# Patient Record
Sex: FEMALE | Race: WHITE | NOT HISPANIC OR LATINO | Employment: FULL TIME | URBAN - METROPOLITAN AREA
[De-identification: names, ages, dates, MRNs, and addresses within clinical notes are randomized per-mention and may not be internally consistent; named-entity substitution may affect disease eponyms.]

---

## 2019-12-30 ENCOUNTER — HOSPITAL ENCOUNTER (OUTPATIENT)
Dept: SLEEP MEDICINE | Facility: HOSPITAL | Age: 39
Discharge: HOME | End: 2019-12-30
Attending: INTERNAL MEDICINE
Payer: COMMERCIAL

## 2019-12-30 DIAGNOSIS — G47.33 OBSTRUCTIVE SLEEP APNEA (ADULT) (PEDIATRIC): ICD-10-CM

## 2019-12-30 PROCEDURE — 95806 SLEEP STUDY UNATT&RESP EFFT: CPT

## 2021-07-27 ENCOUNTER — OFFICE VISIT (OUTPATIENT)
Dept: URGENT CARE | Facility: CLINIC | Age: 41
End: 2021-07-27
Payer: COMMERCIAL

## 2021-07-27 VITALS
RESPIRATION RATE: 20 BRPM | DIASTOLIC BLOOD PRESSURE: 83 MMHG | HEIGHT: 62 IN | SYSTOLIC BLOOD PRESSURE: 133 MMHG | TEMPERATURE: 98 F | HEART RATE: 74 BPM | BODY MASS INDEX: 32.2 KG/M2 | OXYGEN SATURATION: 100 % | WEIGHT: 175 LBS

## 2021-07-27 DIAGNOSIS — Z11.59 SCREENING FOR VIRAL DISEASE: ICD-10-CM

## 2021-07-27 DIAGNOSIS — J06.9 ACUTE URI: Primary | ICD-10-CM

## 2021-07-27 LAB
CTP QC/QA: YES
SARS-COV-2 RDRP RESP QL NAA+PROBE: NEGATIVE

## 2021-07-27 PROCEDURE — U0002 COVID-19 LAB TEST NON-CDC: HCPCS | Mod: QW,S$GLB,, | Performed by: NURSE PRACTITIONER

## 2021-07-27 PROCEDURE — 1159F MED LIST DOCD IN RCRD: CPT | Mod: CPTII,S$GLB,, | Performed by: NURSE PRACTITIONER

## 2021-07-27 PROCEDURE — 99203 PR OFFICE/OUTPT VISIT, NEW, LEVL III, 30-44 MIN: ICD-10-PCS | Mod: S$GLB,,, | Performed by: NURSE PRACTITIONER

## 2021-07-27 PROCEDURE — U0002: ICD-10-PCS | Mod: QW,S$GLB,, | Performed by: NURSE PRACTITIONER

## 2021-07-27 PROCEDURE — 1160F RVW MEDS BY RX/DR IN RCRD: CPT | Mod: CPTII,S$GLB,, | Performed by: NURSE PRACTITIONER

## 2021-07-27 PROCEDURE — 3008F PR BODY MASS INDEX (BMI) DOCUMENTED: ICD-10-PCS | Mod: CPTII,S$GLB,, | Performed by: NURSE PRACTITIONER

## 2021-07-27 PROCEDURE — 99203 OFFICE O/P NEW LOW 30 MIN: CPT | Mod: S$GLB,,, | Performed by: NURSE PRACTITIONER

## 2021-07-27 PROCEDURE — 1159F PR MEDICATION LIST DOCUMENTED IN MEDICAL RECORD: ICD-10-PCS | Mod: CPTII,S$GLB,, | Performed by: NURSE PRACTITIONER

## 2021-07-27 PROCEDURE — 1160F PR REVIEW ALL MEDS BY PRESCRIBER/CLIN PHARMACIST DOCUMENTED: ICD-10-PCS | Mod: CPTII,S$GLB,, | Performed by: NURSE PRACTITIONER

## 2021-07-27 PROCEDURE — 3008F BODY MASS INDEX DOCD: CPT | Mod: CPTII,S$GLB,, | Performed by: NURSE PRACTITIONER

## 2021-07-27 RX ORDER — ACETAMINOPHEN AND PHENYLEPHRINE HCL 325; 5 MG/1; MG/1
1 TABLET ORAL DAILY
COMMUNITY
Start: 2011-12-01

## 2021-07-27 RX ORDER — LOSARTAN POTASSIUM 100 MG/1
100 TABLET ORAL DAILY
COMMUNITY
Start: 2019-10-01

## 2021-07-27 RX ORDER — PANTOPRAZOLE SODIUM 40 MG/1
40 TABLET, DELAYED RELEASE ORAL DAILY
COMMUNITY
Start: 2017-12-15

## 2021-07-27 RX ORDER — LORATADINE 10 MG/1
10 TABLET ORAL DAILY
Qty: 20 TABLET | Refills: 0 | Status: ON HOLD | OUTPATIENT
Start: 2021-07-27 | End: 2023-12-01 | Stop reason: HOSPADM

## 2021-07-27 RX ORDER — AZITHROMYCIN 250 MG/1
TABLET, FILM COATED ORAL
Qty: 6 TABLET | Refills: 0 | Status: SHIPPED | OUTPATIENT
Start: 2021-07-27 | End: 2021-08-01

## 2021-07-27 RX ORDER — LEVOTHYROXINE SODIUM 88 UG/1
88 TABLET ORAL
COMMUNITY
Start: 2010-12-01

## 2021-07-27 RX ORDER — AZELASTINE 1 MG/ML
1 SPRAY, METERED NASAL 2 TIMES DAILY
Qty: 30 ML | Refills: 0 | Status: ON HOLD | OUTPATIENT
Start: 2021-07-27 | End: 2023-12-01 | Stop reason: HOSPADM

## 2021-07-27 RX ORDER — NEBIVOLOL 10 MG/1
10 TABLET ORAL 2 TIMES DAILY
COMMUNITY
Start: 2019-10-01

## 2023-12-01 PROBLEM — D25.0 FIBROIDS, SUBMUCOSAL: Status: ACTIVE | Noted: 2023-12-01

## 2023-12-01 PROBLEM — D25.0 FIBROIDS, SUBMUCOSAL: Status: RESOLVED | Noted: 2023-12-01 | Resolved: 2023-12-01

## 2023-12-01 PROBLEM — N93.8 DYSFUNCTIONAL UTERINE BLEEDING: Status: RESOLVED | Noted: 2023-12-01 | Resolved: 2023-12-01

## 2023-12-01 PROBLEM — N93.8 DYSFUNCTIONAL UTERINE BLEEDING: Status: ACTIVE | Noted: 2023-12-01

## 2024-12-30 ENCOUNTER — OFFICE VISIT (OUTPATIENT)
Dept: OBSTETRICS AND GYNECOLOGY | Facility: CLINIC | Age: 44
End: 2024-12-30
Payer: COMMERCIAL

## 2024-12-30 ENCOUNTER — HOSPITAL ENCOUNTER (OUTPATIENT)
Dept: RADIOLOGY | Facility: OTHER | Age: 44
Discharge: HOME | End: 2024-12-30
Attending: OBSTETRICS & GYNECOLOGY
Payer: COMMERCIAL

## 2024-12-30 VITALS
SYSTOLIC BLOOD PRESSURE: 114 MMHG | BODY MASS INDEX: 31.72 KG/M2 | DIASTOLIC BLOOD PRESSURE: 78 MMHG | WEIGHT: 172.38 LBS | HEIGHT: 62 IN

## 2024-12-30 DIAGNOSIS — D25.1 INTRAMURAL, SUBMUCOUS, AND SUBSEROUS LEIOMYOMA OF UTERUS: ICD-10-CM

## 2024-12-30 DIAGNOSIS — D25.0 INTRAMURAL, SUBMUCOUS, AND SUBSEROUS LEIOMYOMA OF UTERUS: ICD-10-CM

## 2024-12-30 DIAGNOSIS — D25.1 INTRAMURAL, SUBMUCOUS, AND SUBSEROUS LEIOMYOMA OF UTERUS: Primary | ICD-10-CM

## 2024-12-30 DIAGNOSIS — D25.0 INTRAMURAL, SUBMUCOUS, AND SUBSEROUS LEIOMYOMA OF UTERUS: Primary | ICD-10-CM

## 2024-12-30 DIAGNOSIS — D25.2 INTRAMURAL, SUBMUCOUS, AND SUBSEROUS LEIOMYOMA OF UTERUS: ICD-10-CM

## 2024-12-30 DIAGNOSIS — D25.2 INTRAMURAL, SUBMUCOUS, AND SUBSEROUS LEIOMYOMA OF UTERUS: Primary | ICD-10-CM

## 2024-12-30 PROCEDURE — 99999 PR PBB SHADOW E&M-EST. PATIENT-LVL III: CPT | Mod: PBBFAC,,, | Performed by: OBSTETRICS & GYNECOLOGY

## 2024-12-30 PROCEDURE — 76830 TRANSVAGINAL US NON-OB: CPT | Mod: TC

## 2024-12-30 PROCEDURE — 76856 US EXAM PELVIC COMPLETE: CPT | Mod: 26,,, | Performed by: RADIOLOGY

## 2024-12-30 PROCEDURE — 76830 TRANSVAGINAL US NON-OB: CPT | Mod: 26,,, | Performed by: RADIOLOGY

## 2024-12-30 RX ORDER — KETOROLAC TROMETHAMINE 10 MG/1
10 TABLET, FILM COATED ORAL EVERY 6 HOURS
COMMUNITY
Start: 2024-11-01

## 2024-12-30 RX ORDER — CHOLECALCIFEROL (VITAMIN D3) 50 MCG
TABLET ORAL
COMMUNITY
Start: 2013-12-01

## 2024-12-30 RX ORDER — MELOXICAM 15 MG/1
15 TABLET ORAL DAILY
Qty: 30 TABLET | Refills: 1 | Status: SHIPPED | OUTPATIENT
Start: 2024-12-30

## 2024-12-30 NOTE — PROGRESS NOTES
Subjective     Patient ID: Shara Cordoba is a 44 y.o. female.    Chief Complaint:  Fibroids      History of Present Illness  Gynecologic Exam  The patient's pertinent negatives include no genital itching, genital lesions, genital odor, genital rash, missed menses, pelvic pain, vaginal bleeding or vaginal discharge. This is a chronic problem. The current episode started more than 1 month ago. The problem occurs intermittently. The problem has been gradually worsening. The pain is severe. The problem affects both sides. She is not pregnant. Associated symptoms include frequency and urgency. Pertinent negatives include no abdominal pain, anorexia, back pain, chills, constipation, diarrhea, discolored urine, dysuria, fever, flank pain, headaches, hematuria, nausea, painful intercourse, rash or vomiting. The vaginal discharge was normal. The vaginal bleeding is lighter than menses. She has not been passing clots. She has not been passing tissue. The symptoms are aggravated by activity, bowel movements, eating, tactile pressure and urinating. She has tried acetaminophen, heating pad, NSAIDs, oral narcotics and warm bath for the symptoms. The treatment provided moderate relief. She is sexually active. No, her partner does not have an STD. Her menstrual history has been regular. Her past medical history is significant for endometriosis, a gynecological surgery, menorrhagia and ovarian cysts. There is no history of an abdominal surgery, a  section, an ectopic pregnancy, herpes simplex, metrorrhagia, miscarriage, perineal abscess, PID, an STD, a terminated pregnancy or vaginosis.     Pt is 44 y.o. with Patient's last menstrual period was 2024.  Here to get a 2nd opinion about her fibroids.  Patient is currently having pelvic pressure and dyspareunia.  She recently had a hysteroscopy with polypectomy and endometrial ablation in .  Her most recent ultrasound at an outside facility found a large fibroid  measuring approximately 8 cm in size.  Patient is currently scheduled for an abdominal hysterectomy but wanted to know about different options.    GYN & OB History  Patient's last menstrual period was 2024.   Date of Last Pap: No result found    OB History    Para Term  AB Living   0 0 0 0 0 0   SAB IAB Ectopic Multiple Live Births   0 0 0 0 0       Review of Systems  Review of Systems   Constitutional:  Negative for activity change, appetite change, chills, fatigue and fever.   HENT: Negative.  Negative for tinnitus.    Eyes: Negative.    Respiratory:  Negative for cough and shortness of breath.    Cardiovascular:  Negative for chest pain and palpitations.   Gastrointestinal: Negative.  Negative for abdominal pain, anorexia, blood in stool, constipation, diarrhea, nausea and vomiting.   Endocrine: Negative.  Negative for hot flashes.   Genitourinary:  Positive for frequency, menorrhagia and urgency. Negative for dysmenorrhea, dyspareunia, dysuria, flank pain, hematuria, menstrual problem, missed menses, pelvic pain, vaginal discharge, urinary incontinence and postcoital bleeding.   Musculoskeletal:  Negative for back pain and joint swelling.   Integumentary:  Negative for rash, breast mass, nipple discharge and breast skin changes.   Neurological: Negative.  Negative for headaches.   Hematological: Negative.  Does not bruise/bleed easily.   Psychiatric/Behavioral:  The patient is not nervous/anxious.    Breast: negative.  Negative for mass, nipple discharge and skin changes         Objective   Physical Exam:   Constitutional: She is oriented to person, place, and time. She appears well-developed and well-nourished. No distress.    HENT:   Head: Normocephalic and atraumatic.    Eyes: Pupils are equal, round, and reactive to light. Conjunctivae and lids are normal.     Cardiovascular:  Normal rate and regular rhythm.      Exam reveals no edema.        Pulmonary/Chest: Effort normal.     "    Abdominal: Soft. Bowel sounds are normal. She exhibits no distension. There is no abdominal tenderness. There is no rebound and no guarding.         Genitourinary:    Vagina, right adnexa and left adnexa normal.      Pelvic exam was performed with patient supine.   The external female genitalia was normal.     Labial bartholins normal.There is no tenderness or lesion on the right labia. There is no tenderness or lesion on the left labia. Cervix is normal. Right adnexum displays no mass and no tenderness. Left adnexum displays no mass and no tenderness. No vaginal discharge, rectocele, cystocele or prolapse of vaginal walls in the vagina. Cervix exhibits no motion tenderness and no discharge. Uterus is hosting fibroids and uterine contour abnormal . Uterus is not deviated and not fixed. Normal urethral meatus.Urethra findings: no tendernessBladder findings: no bladder tenderness   Genitourinary Comments: A female chaperone was present for the entire exam    Mobile uterus, narrow base             Musculoskeletal: Normal range of motion and moves all extremeties.       Neurological: She is alert and oriented to person, place, and time. She has normal strength.    Skin: Skin is warm and dry.    Psychiatric: She has a normal mood and affect. Her speech is normal and behavior is normal. Thought content normal. Her mood appears not anxious. She does not exhibit a depressed mood. She expresses no suicidal plans and no homicidal plans.            Assessment and Plan     1. Intramural, submucous, and subserous leiomyoma of uterus             Plan:  Shara Crowder" was seen today for fibroids.    Diagnoses and all orders for this visit:    Intramural, submucous, and subserous leiomyoma of uterus  -     US Pelvis Comp with Transvag NON-OB (xpd; Future  -     meloxicam (MOBIC) 15 MG tablet; Take 1 tablet (15 mg total) by mouth once daily.      I had a long discussion with the patient and her partner about her fibroid and the " different treatment options.  We discussed the different medical therapies and surgical approaches.  Patient is currently scheduled for an abdominal hysterectomy but is nervous that the possibility of needing a vertical skin incision.  We discussed how the uterus does appear mobile but many times the surgical decision is made at the time of anesthesia and exam under anesthesia.  We discussed how it was difficult to read the ultrasound in the referral and patient was open to having a repeat ultrasound.  Patient is from the [place] area.  I discussed how a laparoscopic approach may not be unreasonable but feel that a vaginal approach would be challenging given the large anterior uterus.  We will reassess after we get the results of the new ultrasound.  We discussed the possibility of being referred to Dr. Jonse out at [facility] since I would be closer to home.  All questions were answered.

## 2024-12-31 ENCOUNTER — PATIENT MESSAGE (OUTPATIENT)
Dept: OBSTETRICS AND GYNECOLOGY | Facility: CLINIC | Age: 44
End: 2024-12-31
Payer: COMMERCIAL

## 2025-01-03 NOTE — TELEPHONE ENCOUNTER
That medication can cause hypertension, I recommend stopping it to see if the swelling gets better and then recheck her BP. If she is having a headache, blurry vision or getting BP readings > 160/90, then yes to the ER

## 2025-01-06 ENCOUNTER — TELEPHONE (OUTPATIENT)
Dept: OBSTETRICS AND GYNECOLOGY | Facility: CLINIC | Age: 45
End: 2025-01-06
Payer: COMMERCIAL

## 2025-01-06 DIAGNOSIS — D25.1 INTRAMURAL, SUBMUCOUS, AND SUBSEROUS LEIOMYOMA OF UTERUS: Primary | ICD-10-CM

## 2025-01-06 DIAGNOSIS — D25.0 INTRAMURAL, SUBMUCOUS, AND SUBSEROUS LEIOMYOMA OF UTERUS: Primary | ICD-10-CM

## 2025-01-06 DIAGNOSIS — D25.2 INTRAMURAL, SUBMUCOUS, AND SUBSEROUS LEIOMYOMA OF UTERUS: Primary | ICD-10-CM

## 2025-01-06 NOTE — TELEPHONE ENCOUNTER
----- Message from [facility] sent at 1/6/2025 10:29 AM CST -----      Name of Who is Calling: TERESITA ACEVES [[id number]]      What is the request in detail: Pt called to speak with the office regarding questions before upcoming procedure.Please contact to further discuss and advise.          Can the clinic reply by [facility]: Y      What [facility]  [phone]

## 2025-01-17 ENCOUNTER — PATIENT MESSAGE (OUTPATIENT)
Dept: OBSTETRICS AND GYNECOLOGY | Facility: CLINIC | Age: 45
End: 2025-01-17
Payer: COMMERCIAL

## 2025-01-29 ENCOUNTER — HOSPITAL ENCOUNTER (OUTPATIENT)
Dept: PREADMISSION TESTING | Facility: OTHER | Age: 45
Discharge: HOME | End: 2025-01-29
Attending: OBSTETRICS & GYNECOLOGY
Payer: COMMERCIAL

## 2025-01-29 ENCOUNTER — ANESTHESIA EVENT (OUTPATIENT)
Dept: SURGERY | Facility: OTHER | Age: 45
End: 2025-01-29
Payer: COMMERCIAL

## 2025-01-29 VITALS
TEMPERATURE: 97 F | HEART RATE: 56 BPM | HEIGHT: 62 IN | OXYGEN SATURATION: 100 % | RESPIRATION RATE: 16 BRPM | BODY MASS INDEX: 31.65 KG/M2 | DIASTOLIC BLOOD PRESSURE: 73 MMHG | WEIGHT: 172 LBS | SYSTOLIC BLOOD PRESSURE: 131 MMHG

## 2025-01-29 DIAGNOSIS — Z01.818 PREOP TESTING: Primary | ICD-10-CM

## 2025-01-29 LAB
ABO + RH BLD: NORMAL
BLD GP AB SCN CELLS X3 SERPL QL: NORMAL
SPECIMEN OUTDATE: NORMAL

## 2025-01-29 PROCEDURE — 86850 RBC ANTIBODY SCREEN: CPT | Performed by: ANESTHESIOLOGY

## 2025-01-29 PROCEDURE — 36415 COLL VENOUS BLD VENIPUNCTURE: CPT | Performed by: ANESTHESIOLOGY

## 2025-01-29 RX ORDER — LIDOCAINE HYDROCHLORIDE 10 MG/ML
0.5 INJECTION, SOLUTION EPIDURAL; INFILTRATION; INTRACAUDAL; PERINEURAL ONCE
Status: CANCELLED | OUTPATIENT
Start: 2025-01-29 | End: 2025-01-29

## 2025-01-29 RX ORDER — SODIUM CHLORIDE, SODIUM LACTATE, POTASSIUM CHLORIDE, CALCIUM CHLORIDE 600; 310; 30; 20 MG/100ML; MG/100ML; MG/100ML; MG/100ML
INJECTION, SOLUTION INTRAVENOUS CONTINUOUS
Status: CANCELLED | OUTPATIENT
Start: 2025-01-29

## 2025-01-29 RX ORDER — HYDROCHLOROTHIAZIDE 25 MG/1
25 TABLET ORAL
COMMUNITY

## 2025-01-29 RX ORDER — ACETAMINOPHEN 325 MG/1
975 TABLET ORAL
Status: CANCELLED | OUTPATIENT
Start: 2025-01-29 | End: 2025-01-29

## 2025-01-29 RX ORDER — FAMOTIDINE 20 MG/1
20 TABLET, FILM COATED ORAL
Status: CANCELLED | OUTPATIENT
Start: 2025-01-29 | End: 2025-01-29

## 2025-01-29 NOTE — DISCHARGE INSTRUCTIONS
Information to Prepare you for your Surgery    PRE-ADMIT TESTING   [address]  [place] [facility]  [address]   [phone]  - DEVEN Levy    Your surgery has been scheduled at [facility]. We are pleased to have the opportunity to serve you. For Further Information please call [phone].    On the day of surgery please report to Registration on the 1st floor of the [facility].    CONTACT YOUR PHYSICIAN'S OFFICE THE DAY PRIOR TO YOUR SURGERY TO OBTAIN YOUR ARRIVAL TIME.     The evening before surgery do not eat anything after 9 p.m. ( this includes hard candy, chewing gum and mints).  You may only have GATORADE, POWERADE AND WATER  from 9 p.m. until you leave your home.     DRINK AT LEAST 12 OUNCES THE MORNING OF SURGERY    DO NOT DRINK ANY LIQUIDS ON THE WAY TO THE HOSPITAL.      Why does your anesthesiologist allow you to drink Gatorade/Powerade before surgery?    Gatorade/Powerade helps to increase your comfort before surgery and to decrease your nausea after surgery.  The carbohydrates in the Gatorade/Powerade help reduce your body's stress response to surgery.  If you are diabetic, drink only water prior to surgery.       Outpatient Surgery- May allow 2 adults (18 and older)/ Support Persons (1 being the designated ) for all surgical/procedural patients. A breastfeeding mother will be allowed her infant and 2 adult Support Persons. No one under the age of 18 will be allowed in the building.    MEDICATION INSTRUCTIONS: TAKE medications checked off by the Anesthesiologist on your Medication List.      Angiogram Patients: Take medications as instructed by your physician, including aspirin.     Surgery Patients:  If you take ASPIRIN - Your PHYSICIAN/SURGEON will need to inform you IF/OR when you need to stop taking aspirin prior to your surgery.     Starting the week prior to surgery, do not take any medications containing IBUPROFEN or NSAIDS (Advil,  Aleve, BC, Celebrex, Goody's, Ketorolac, Meloxicam, Mobic, Motrin, Naproxen, Toradol, etc).  If you are not sure if you should take a medicine please call your surgeon's office.  You may take Tylenol.    Do Not Wear any make-up (especially eye make-up) to surgery. Please remove any false eyelashes or eyelash extensions. If you arrive the day of surgery with makeup/eyelashes on you will be required to remove prior to surgery. (There is a risk of corneal abrasions if eye makeup/eyelash extensions are not removed)    Leave all valuables at home.   Do Not wear any jewelry or watches, including any metal in body piercings. Jewelry must be removed prior to coming to the hospital.  There is a possibility that rings that are unable to be removed may be cut off if they are on the surgical extremity.    Please remove all hair extensions, wigs, clips and any other metal accessories/ ornaments from your hair.  These items may pose a flammable/fire risk in Surgery and must be removed.    Do not shave your surgical area at least 5 days prior to your surgery. The surgical prep will be performed at the hospital according to Infection Control regulations.    Contact Lens must be removed before surgery. Either do not wear the contact lens or bring a case and solution for storage.  Please bring a container for eyeglasses or dentures as required.  Bring any paperwork your physician has provided, such as consent forms,  history and physicals, doctor's orders, etc.   Bring comfortable clothes that are loose fitting to wear upon discharge. Take into consideration the type of surgery being performed.  Maintain your diet as advised per your physician the day prior to surgery.    Adequate rest the night before surgery is advised.   Park in the Parking lot behind the hospital or in the [employer] [facility] across the street from the parking lot. Parking is complimentary.  If you will be discharged the same day as your procedure, please  arrange for a responsible adult to drive you home or to accompany you if traveling by taxi.   YOU WILL NOT BE PERMITTED TO DRIVE OR TO LEAVE THE HOSPITAL ALONE AFTER SURGERY.   If you are being discharged the same day, it is strongly recommended that you arrange for someone to remain with you for the first 24 hrs following your surgery.    The Surgeon will speak to your family/visitor after your surgery regarding the outcome of your surgery and post op care.  The Surgeon may speak to you after your surgery, but there is a possibility you may not remember the details.  Please check with your family members regarding the conversation with the Surgeon.         Bathing Instructions with Hibiclens:    Shower the evening before and morning of your procedure with Chlorhexidine (Hibiclens)  do not use Chlorhexidine on your face or genitals. Do not get in your eyes.  Wash your face with water and your regular face wash/soap  Use your regular shampoo  Apply Chlorhexidine (Hibiclens) directly on your skin or on a wet washcloth and wash gently. When showering: Move away from the shower stream when applying Chlorhexidine (Hibiclens) to avoid rinsing off too soon.  Rinse thoroughly with warm water  Do not dilute Chlorhexidine (Hibiclens)   Dry off as usual, do not use any deodorant, powder, body lotions, perfume, after shave or cologne.     We strongly recommend whoever is bringing you home be present for discharge instructions.  This will ensure a thorough understanding for your post op home care.    If the patient has fever, cough, or signs/symptoms of Flu or Covid please do not come in for your surgery.  First, contact the pre op department at [phone] (unit opens at 5AM) and then contact your surgeon and your primary care physician for further instructions.      If applicable, please bring any blood pressure and/or diabetes medications with you the day of surgery.  If on home oxygen, even at night, please bring your  portable oxygen tank with you the day of surgery in case it is needed for your discharge.      Thanks,  DEVEN Levy

## 2025-01-29 NOTE — ANESTHESIA PREPROCEDURE EVALUATION
01/29/2025  Shara Cordoba is a 44 y.o., female.      Pre-op Assessment    I have reviewed the Patient Summary Reports.     I have reviewed the Nursing Notes. I have reviewed the NPO Status.   I have reviewed the Medications.     Review of Systems  Anesthesia Hx:  No problems with previous Anesthesia                Social:  Non-Smoker       EENT/Dental:  EENT/Dental Normal           Cardiovascular:     Hypertension, well controlled                                          Pulmonary:  Pulmonary Normal                       Hepatic/GI:   PUD,  GERD, well controlled                Endocrine:   Hypothyroidism        Obesity / BMI > 30  Psych:  Psychiatric Normal                    Physical Exam  General: Cooperative and Alert    Airway:  Mallampati: II   Mouth Opening: Normal  TM Distance: Normal  Tongue: Normal  Neck ROM: Normal ROM    Dental:  Intact, Veneers        Anesthesia Plan  Type of Anesthesia, risks & benefits discussed:    Anesthesia Type: Gen ETT  Intra-op Monitoring Plan: Standard ASA Monitors  Post Op Pain Control Plan: multimodal analgesia  Induction:  IV  Airway Plan: Video  Informed Consent: Informed consent signed with the Patient and all parties understand the risks and agree with anesthesia plan.  All questions answered.   ASA Score: 2  Anesthesia Plan Notes: Recent labs in media.   Saw pcp 2 weeks ago in [place], all issues addressed.    Ready For Surgery From Anesthesia Perspective.     .

## 2025-01-31 DIAGNOSIS — D25.9 FIBROID UTERUS: ICD-10-CM

## 2025-01-31 RX ORDER — MUPIROCIN 20 MG/G
OINTMENT TOPICAL
Status: CANCELLED | OUTPATIENT
Start: 2025-01-31

## 2025-01-31 RX ORDER — SODIUM CHLORIDE 9 MG/ML
INJECTION, SOLUTION INTRAVENOUS CONTINUOUS
Status: CANCELLED | OUTPATIENT
Start: 2025-01-31

## 2025-01-31 RX ORDER — FAMOTIDINE 20 MG/1
20 TABLET, FILM COATED ORAL
Status: CANCELLED | OUTPATIENT
Start: 2025-01-31

## 2025-02-03 ENCOUNTER — ANESTHESIA (OUTPATIENT)
Dept: SURGERY | Facility: OTHER | Age: 45
End: 2025-02-03
Payer: COMMERCIAL

## 2025-02-03 ENCOUNTER — TELEPHONE (OUTPATIENT)
Dept: OBSTETRICS AND GYNECOLOGY | Facility: CLINIC | Age: 45
End: 2025-02-03
Payer: COMMERCIAL

## 2025-02-03 ENCOUNTER — HOSPITAL ENCOUNTER (OUTPATIENT)
Facility: OTHER | Age: 45
Discharge: HOME | End: 2025-02-03
Attending: OBSTETRICS & GYNECOLOGY | Admitting: OBSTETRICS & GYNECOLOGY
Payer: COMMERCIAL

## 2025-02-03 DIAGNOSIS — Z90.710 S/P VH (VAGINAL HYSTERECTOMY): Primary | ICD-10-CM

## 2025-02-03 DIAGNOSIS — D25.9 FIBROID UTERUS: ICD-10-CM

## 2025-02-03 PROBLEM — D25.2 INTRAMURAL, SUBMUCOUS, AND SUBSEROUS LEIOMYOMA OF UTERUS: Status: RESOLVED | Noted: 2025-02-03 | Resolved: 2025-02-03

## 2025-02-03 PROBLEM — D25.2 INTRAMURAL, SUBMUCOUS, AND SUBSEROUS LEIOMYOMA OF UTERUS: Status: ACTIVE | Noted: 2025-02-03

## 2025-02-03 PROBLEM — D25.0 INTRAMURAL, SUBMUCOUS, AND SUBSEROUS LEIOMYOMA OF UTERUS: Status: ACTIVE | Noted: 2025-02-03

## 2025-02-03 PROBLEM — D25.1 INTRAMURAL, SUBMUCOUS, AND SUBSEROUS LEIOMYOMA OF UTERUS: Status: RESOLVED | Noted: 2025-02-03 | Resolved: 2025-02-03

## 2025-02-03 PROBLEM — D25.0 INTRAMURAL, SUBMUCOUS, AND SUBSEROUS LEIOMYOMA OF UTERUS: Status: RESOLVED | Noted: 2025-02-03 | Resolved: 2025-02-03

## 2025-02-03 PROBLEM — D25.1 INTRAMURAL, SUBMUCOUS, AND SUBSEROUS LEIOMYOMA OF UTERUS: Status: ACTIVE | Noted: 2025-02-03

## 2025-02-03 LAB
B-HCG UR QL: NEGATIVE
CTP QC/QA: YES
POCT GLUCOSE: 93 MG/DL (ref 70–110)

## 2025-02-03 PROCEDURE — 63600175 PHARM REV CODE 636 W HCPCS: Performed by: ANESTHESIOLOGY

## 2025-02-03 PROCEDURE — 25000003 PHARM REV CODE 250: Performed by: OBSTETRICS & GYNECOLOGY

## 2025-02-03 PROCEDURE — 88307 TISSUE EXAM BY PATHOLOGIST: CPT | Mod: 26,,, | Performed by: PATHOLOGY

## 2025-02-03 PROCEDURE — 27201423 OPTIME MED/SURG SUP & DEVICES STERILE SUPPLY: Performed by: OBSTETRICS & GYNECOLOGY

## 2025-02-03 PROCEDURE — 25000003 PHARM REV CODE 250: Performed by: ANESTHESIOLOGY

## 2025-02-03 PROCEDURE — 36000710: Performed by: OBSTETRICS & GYNECOLOGY

## 2025-02-03 PROCEDURE — 88302 TISSUE EXAM BY PATHOLOGIST: CPT | Performed by: PATHOLOGY

## 2025-02-03 PROCEDURE — 71000016 HC POSTOP RECOV ADDL HR: Performed by: OBSTETRICS & GYNECOLOGY

## 2025-02-03 PROCEDURE — D9220A PRA ANESTHESIA: Mod: CRNA,,, | Performed by: STUDENT IN AN ORGANIZED HEALTH CARE EDUCATION/TRAINING PROGRAM

## 2025-02-03 PROCEDURE — 37000008 HC ANESTHESIA 1ST 15 MINUTES: Performed by: OBSTETRICS & GYNECOLOGY

## 2025-02-03 PROCEDURE — 51702 INSERT TEMP BLADDER CATH: CPT | Performed by: OBSTETRICS & GYNECOLOGY

## 2025-02-03 PROCEDURE — 63600175 PHARM REV CODE 636 W HCPCS: Performed by: STUDENT IN AN ORGANIZED HEALTH CARE EDUCATION/TRAINING PROGRAM

## 2025-02-03 PROCEDURE — 71000033 HC RECOVERY, INTIAL HOUR: Performed by: OBSTETRICS & GYNECOLOGY

## 2025-02-03 PROCEDURE — 63600175 PHARM REV CODE 636 W HCPCS: Mod: TB,JZ | Performed by: STUDENT IN AN ORGANIZED HEALTH CARE EDUCATION/TRAINING PROGRAM

## 2025-02-03 PROCEDURE — 88302 TISSUE EXAM BY PATHOLOGIST: CPT | Mod: 26,,, | Performed by: PATHOLOGY

## 2025-02-03 PROCEDURE — 25000003 PHARM REV CODE 250: Performed by: STUDENT IN AN ORGANIZED HEALTH CARE EDUCATION/TRAINING PROGRAM

## 2025-02-03 PROCEDURE — 81025 URINE PREGNANCY TEST: CPT | Performed by: ANESTHESIOLOGY

## 2025-02-03 PROCEDURE — D9220A PRA ANESTHESIA: Mod: ANES,,, | Performed by: ANESTHESIOLOGY

## 2025-02-03 PROCEDURE — 37000009 HC ANESTHESIA EA ADD 15 MINS: Performed by: OBSTETRICS & GYNECOLOGY

## 2025-02-03 PROCEDURE — 36000711: Performed by: OBSTETRICS & GYNECOLOGY

## 2025-02-03 PROCEDURE — 88307 TISSUE EXAM BY PATHOLOGIST: CPT | Performed by: PATHOLOGY

## 2025-02-03 PROCEDURE — 82962 GLUCOSE BLOOD TEST: CPT | Performed by: OBSTETRICS & GYNECOLOGY

## 2025-02-03 PROCEDURE — 71000015 HC POSTOP RECOV 1ST HR: Performed by: OBSTETRICS & GYNECOLOGY

## 2025-02-03 PROCEDURE — 58554 LAPARO-VAG HYST W/T/O COMPL: CPT | Mod: ,,, | Performed by: OBSTETRICS & GYNECOLOGY

## 2025-02-03 PROCEDURE — 63600175 PHARM REV CODE 636 W HCPCS: Performed by: OBSTETRICS & GYNECOLOGY

## 2025-02-03 PROCEDURE — 63600175 PHARM REV CODE 636 W HCPCS: Mod: TB,JZ | Performed by: ANESTHESIOLOGY

## 2025-02-03 RX ORDER — ROCURONIUM BROMIDE 10 MG/ML
INJECTION, SOLUTION INTRAVENOUS
Status: DISCONTINUED | OUTPATIENT
Start: 2025-02-03 | End: 2025-02-03

## 2025-02-03 RX ORDER — KETOROLAC TROMETHAMINE 30 MG/ML
INJECTION, SOLUTION INTRAMUSCULAR; INTRAVENOUS
Status: DISCONTINUED | OUTPATIENT
Start: 2025-02-03 | End: 2025-02-03

## 2025-02-03 RX ORDER — NITROFURANTOIN 25; 75 MG/1; MG/1
100 CAPSULE ORAL 2 TIMES DAILY
Qty: 10 CAPSULE | Refills: 0 | Status: SHIPPED | OUTPATIENT
Start: 2025-02-03 | End: 2025-02-06 | Stop reason: ALTCHOICE

## 2025-02-03 RX ORDER — PHENAZOPYRIDINE HYDROCHLORIDE 100 MG/1
200 TABLET, FILM COATED ORAL 3 TIMES DAILY PRN
Qty: 20 TABLET | Refills: 0 | Status: ON HOLD | OUTPATIENT
Start: 2025-02-03 | End: 2025-02-09

## 2025-02-03 RX ORDER — HYDROMORPHONE HYDROCHLORIDE 2 MG/ML
0.2 INJECTION, SOLUTION INTRAMUSCULAR; INTRAVENOUS; SUBCUTANEOUS
Status: CANCELLED | OUTPATIENT
Start: 2025-02-03

## 2025-02-03 RX ORDER — EPHEDRINE SULFATE 50 MG/ML
INJECTION, SOLUTION INTRAVENOUS
Status: DISCONTINUED | OUTPATIENT
Start: 2025-02-03 | End: 2025-02-03

## 2025-02-03 RX ORDER — PHENYLEPHRINE HYDROCHLORIDE 10 MG/ML
INJECTION INTRAVENOUS
Status: DISCONTINUED | OUTPATIENT
Start: 2025-02-03 | End: 2025-02-03

## 2025-02-03 RX ORDER — SODIUM CHLORIDE 9 MG/ML
INJECTION, SOLUTION INTRAVENOUS CONTINUOUS
Status: DISCONTINUED | OUTPATIENT
Start: 2025-02-03 | End: 2025-02-03 | Stop reason: HOSPADM

## 2025-02-03 RX ORDER — DOCUSATE SODIUM 100 MG/1
100 CAPSULE, LIQUID FILLED ORAL 2 TIMES DAILY
Qty: 60 CAPSULE | Refills: 0 | Status: SHIPPED | OUTPATIENT
Start: 2025-02-03

## 2025-02-03 RX ORDER — KETOROLAC TROMETHAMINE 30 MG/ML
30 INJECTION, SOLUTION INTRAMUSCULAR; INTRAVENOUS ONCE
Status: COMPLETED | OUTPATIENT
Start: 2025-02-03 | End: 2025-02-03

## 2025-02-03 RX ORDER — OXYCODONE HYDROCHLORIDE 5 MG/1
5 TABLET ORAL EVERY 4 HOURS PRN
Qty: 20 TABLET | Refills: 0 | Status: SHIPPED | OUTPATIENT
Start: 2025-02-03 | End: 2025-02-05 | Stop reason: SDUPTHER

## 2025-02-03 RX ORDER — DEXAMETHASONE SODIUM PHOSPHATE 4 MG/ML
INJECTION, SOLUTION INTRA-ARTICULAR; INTRALESIONAL; INTRAMUSCULAR; INTRAVENOUS; SOFT TISSUE
Status: DISCONTINUED | OUTPATIENT
Start: 2025-02-03 | End: 2025-02-03

## 2025-02-03 RX ORDER — GLUCAGON 1 MG
1 KIT INJECTION
Status: DISCONTINUED | OUTPATIENT
Start: 2025-02-03 | End: 2025-02-03 | Stop reason: HOSPADM

## 2025-02-03 RX ORDER — LIDOCAINE HYDROCHLORIDE 10 MG/ML
0.5 INJECTION, SOLUTION EPIDURAL; INFILTRATION; INTRACAUDAL; PERINEURAL ONCE
Status: DISCONTINUED | OUTPATIENT
Start: 2025-02-03 | End: 2025-02-03 | Stop reason: HOSPADM

## 2025-02-03 RX ORDER — PROCHLORPERAZINE EDISYLATE 5 MG/ML
5 INJECTION INTRAMUSCULAR; INTRAVENOUS EVERY 30 MIN PRN
Status: DISCONTINUED | OUTPATIENT
Start: 2025-02-03 | End: 2025-02-03 | Stop reason: HOSPADM

## 2025-02-03 RX ORDER — SODIUM CHLORIDE, SODIUM LACTATE, POTASSIUM CHLORIDE, CALCIUM CHLORIDE 600; 310; 30; 20 MG/100ML; MG/100ML; MG/100ML; MG/100ML
INJECTION, SOLUTION INTRAVENOUS CONTINUOUS
Status: DISCONTINUED | OUTPATIENT
Start: 2025-02-03 | End: 2025-02-03 | Stop reason: HOSPADM

## 2025-02-03 RX ORDER — MIDAZOLAM HYDROCHLORIDE 1 MG/ML
INJECTION INTRAMUSCULAR; INTRAVENOUS
Status: DISCONTINUED | OUTPATIENT
Start: 2025-02-03 | End: 2025-02-03

## 2025-02-03 RX ORDER — OXYCODONE HYDROCHLORIDE 5 MG/1
5 TABLET ORAL
Status: DISCONTINUED | OUTPATIENT
Start: 2025-02-03 | End: 2025-02-03 | Stop reason: HOSPADM

## 2025-02-03 RX ORDER — SODIUM CHLORIDE 0.9 % (FLUSH) 0.9 %
3 SYRINGE (ML) INJECTION
Status: DISCONTINUED | OUTPATIENT
Start: 2025-02-03 | End: 2025-02-03 | Stop reason: HOSPADM

## 2025-02-03 RX ORDER — FAMOTIDINE 20 MG/1
20 TABLET, FILM COATED ORAL
Status: COMPLETED | OUTPATIENT
Start: 2025-02-03 | End: 2025-02-03

## 2025-02-03 RX ORDER — FAMOTIDINE 20 MG/1
20 TABLET, FILM COATED ORAL
Status: DISCONTINUED | OUTPATIENT
Start: 2025-02-03 | End: 2025-02-03 | Stop reason: HOSPADM

## 2025-02-03 RX ORDER — FENTANYL CITRATE 50 UG/ML
INJECTION, SOLUTION INTRAMUSCULAR; INTRAVENOUS
Status: DISCONTINUED | OUTPATIENT
Start: 2025-02-03 | End: 2025-02-03

## 2025-02-03 RX ORDER — CLINDAMYCIN PHOSPHATE 900 MG/50ML
900 INJECTION, SOLUTION INTRAVENOUS
Status: COMPLETED | OUTPATIENT
Start: 2025-02-03 | End: 2025-02-03

## 2025-02-03 RX ORDER — ONDANSETRON HYDROCHLORIDE 2 MG/ML
4 INJECTION, SOLUTION INTRAVENOUS EVERY 4 HOURS PRN
Status: CANCELLED | OUTPATIENT
Start: 2025-02-03

## 2025-02-03 RX ORDER — MUPIROCIN 20 MG/G
OINTMENT TOPICAL
Status: DISCONTINUED | OUTPATIENT
Start: 2025-02-03 | End: 2025-02-03 | Stop reason: HOSPADM

## 2025-02-03 RX ORDER — PROPOFOL 10 MG/ML
VIAL (ML) INTRAVENOUS
Status: DISCONTINUED | OUTPATIENT
Start: 2025-02-03 | End: 2025-02-03

## 2025-02-03 RX ORDER — DEXMEDETOMIDINE HYDROCHLORIDE 100 UG/ML
INJECTION, SOLUTION INTRAVENOUS
Status: DISCONTINUED | OUTPATIENT
Start: 2025-02-03 | End: 2025-02-03

## 2025-02-03 RX ORDER — LIDOCAINE HYDROCHLORIDE 20 MG/ML
INJECTION INTRAVENOUS
Status: DISCONTINUED | OUTPATIENT
Start: 2025-02-03 | End: 2025-02-03

## 2025-02-03 RX ORDER — FENTANYL CITRATE 50 UG/ML
100 INJECTION, SOLUTION INTRAMUSCULAR; INTRAVENOUS ONCE
Status: COMPLETED | OUTPATIENT
Start: 2025-02-03 | End: 2025-02-03

## 2025-02-03 RX ORDER — DIPHENHYDRAMINE HYDROCHLORIDE 50 MG/ML
25 INJECTION INTRAMUSCULAR; INTRAVENOUS EVERY 6 HOURS PRN
Status: DISCONTINUED | OUTPATIENT
Start: 2025-02-03 | End: 2025-02-03 | Stop reason: HOSPADM

## 2025-02-03 RX ORDER — HYDROMORPHONE HYDROCHLORIDE 2 MG/ML
0.4 INJECTION, SOLUTION INTRAMUSCULAR; INTRAVENOUS; SUBCUTANEOUS EVERY 5 MIN PRN
Status: DISCONTINUED | OUTPATIENT
Start: 2025-02-03 | End: 2025-02-03 | Stop reason: HOSPADM

## 2025-02-03 RX ORDER — ACETAMINOPHEN 500 MG
1000 TABLET ORAL EVERY 6 HOURS PRN
Status: CANCELLED | OUTPATIENT
Start: 2025-02-03

## 2025-02-03 RX ORDER — ACETAMINOPHEN 325 MG/1
975 TABLET ORAL
Status: COMPLETED | OUTPATIENT
Start: 2025-02-03 | End: 2025-02-03

## 2025-02-03 RX ORDER — OXYCODONE HYDROCHLORIDE 5 MG/1
5 TABLET ORAL ONCE
Status: COMPLETED | OUTPATIENT
Start: 2025-02-03 | End: 2025-02-03

## 2025-02-03 RX ORDER — NITROFURANTOIN 25; 75 MG/1; MG/1
100 CAPSULE ORAL 2 TIMES DAILY
Qty: 10 CAPSULE | Refills: 0 | Status: SHIPPED | OUTPATIENT
Start: 2025-02-03 | End: 2025-02-03

## 2025-02-03 RX ORDER — ONDANSETRON HYDROCHLORIDE 2 MG/ML
INJECTION, SOLUTION INTRAVENOUS
Status: DISCONTINUED | OUTPATIENT
Start: 2025-02-03 | End: 2025-02-03

## 2025-02-03 RX ORDER — CYCLOBENZAPRINE HCL 5 MG
10 TABLET ORAL ONCE
Status: COMPLETED | OUTPATIENT
Start: 2025-02-03 | End: 2025-02-03

## 2025-02-03 RX ORDER — PROPOFOL 10 MG/ML
VIAL (ML) INTRAVENOUS CONTINUOUS PRN
Status: DISCONTINUED | OUTPATIENT
Start: 2025-02-03 | End: 2025-02-03

## 2025-02-03 RX ORDER — DEXTROMETHORPHAN HYDROBROMIDE, GUAIFENESIN 5; 100 MG/5ML; MG/5ML
650 LIQUID ORAL EVERY 8 HOURS
Qty: 30 TABLET | Refills: 0 | Status: SHIPPED | OUTPATIENT
Start: 2025-02-03

## 2025-02-03 RX ORDER — OXYCODONE HYDROCHLORIDE 5 MG/1
5 TABLET ORAL EVERY 4 HOURS PRN
Status: CANCELLED | OUTPATIENT
Start: 2025-02-03

## 2025-02-03 RX ORDER — KETAMINE HYDROCHLORIDE 50 MG/ML
INJECTION, SOLUTION INTRAMUSCULAR; INTRAVENOUS
Status: DISCONTINUED | OUTPATIENT
Start: 2025-02-03 | End: 2025-02-03

## 2025-02-03 RX ADMIN — PHENYLEPHRINE HYDROCHLORIDE 100 MCG: 10 INJECTION INTRAVENOUS at 07:02

## 2025-02-03 RX ADMIN — SODIUM CHLORIDE: 0.9 INJECTION, SOLUTION INTRAVENOUS at 07:02

## 2025-02-03 RX ADMIN — HYDROMORPHONE HYDROCHLORIDE 0.4 MG: 2 INJECTION INTRAMUSCULAR; INTRAVENOUS; SUBCUTANEOUS at 11:02

## 2025-02-03 RX ADMIN — CLINDAMYCIN PHOSPHATE 900 MG: 18 INJECTION, SOLUTION INTRAVENOUS at 07:02

## 2025-02-03 RX ADMIN — GLYCOPYRROLATE 0.2 MG: 0.2 INJECTION, SOLUTION INTRAMUSCULAR; INTRAVITREAL at 07:02

## 2025-02-03 RX ADMIN — KETOROLAC TROMETHAMINE 30 MG: 30 INJECTION, SOLUTION INTRAMUSCULAR; INTRAVENOUS at 12:02

## 2025-02-03 RX ADMIN — DEXMEDETOMIDINE HYDROCHLORIDE 8 MCG: 100 INJECTION, SOLUTION, CONCENTRATE INTRAVENOUS at 09:02

## 2025-02-03 RX ADMIN — KETOROLAC TROMETHAMINE 30 MG: 30 INJECTION, SOLUTION INTRAMUSCULAR; INTRAVENOUS at 10:02

## 2025-02-03 RX ADMIN — ROCURONIUM BROMIDE 10 MG: 10 SOLUTION INTRAVENOUS at 08:02

## 2025-02-03 RX ADMIN — ROCURONIUM BROMIDE 50 MG: 10 SOLUTION INTRAVENOUS at 07:02

## 2025-02-03 RX ADMIN — OXYCODONE HYDROCHLORIDE 5 MG: 5 TABLET ORAL at 12:02

## 2025-02-03 RX ADMIN — GENTAMICIN SULFATE 306.4 MG: 40 INJECTION, SOLUTION INTRAMUSCULAR; INTRAVENOUS at 07:02

## 2025-02-03 RX ADMIN — SODIUM CHLORIDE: 0.9 INJECTION, SOLUTION INTRAVENOUS at 10:02

## 2025-02-03 RX ADMIN — ROCURONIUM BROMIDE 20 MG: 10 SOLUTION INTRAVENOUS at 09:02

## 2025-02-03 RX ADMIN — PROPOFOL 200 MG: 10 INJECTION, EMULSION INTRAVENOUS at 07:02

## 2025-02-03 RX ADMIN — KETAMINE HYDROCHLORIDE 25 MG: 50 INJECTION, SOLUTION INTRAMUSCULAR; INTRAVENOUS at 07:02

## 2025-02-03 RX ADMIN — ONDANSETRON HYDROCHLORIDE 4 MG: 2 INJECTION INTRAMUSCULAR; INTRAVENOUS at 10:02

## 2025-02-03 RX ADMIN — PROPOFOL 25 MCG/KG/MIN: 10 INJECTION, EMULSION INTRAVENOUS at 09:02

## 2025-02-03 RX ADMIN — CYCLOBENZAPRINE HYDROCHLORIDE 10 MG: 5 TABLET, FILM COATED ORAL at 12:02

## 2025-02-03 RX ADMIN — MUPIROCIN: 20 OINTMENT TOPICAL at 06:02

## 2025-02-03 RX ADMIN — FENTANYL CITRATE 50 MCG: 50 INJECTION, SOLUTION INTRAMUSCULAR; INTRAVENOUS at 09:02

## 2025-02-03 RX ADMIN — OXYCODONE HYDROCHLORIDE 5 MG: 5 TABLET ORAL at 10:02

## 2025-02-03 RX ADMIN — FENTANYL CITRATE 100 MCG: 50 INJECTION, SOLUTION INTRAMUSCULAR; INTRAVENOUS at 07:02

## 2025-02-03 RX ADMIN — ROCURONIUM BROMIDE 20 MG: 10 SOLUTION INTRAVENOUS at 08:02

## 2025-02-03 RX ADMIN — ACETAMINOPHEN 975 MG: 325 TABLET, FILM COATED ORAL at 06:02

## 2025-02-03 RX ADMIN — EPHEDRINE SULFATE 10 MG: 50 INJECTION INTRAVENOUS at 08:02

## 2025-02-03 RX ADMIN — SUGAMMADEX 200 MG: 100 INJECTION, SOLUTION INTRAVENOUS at 10:02

## 2025-02-03 RX ADMIN — OXYCODONE HYDROCHLORIDE 5 MG: 5 TABLET ORAL at 02:02

## 2025-02-03 RX ADMIN — MIDAZOLAM HYDROCHLORIDE 2 MG: 1 INJECTION INTRAMUSCULAR; INTRAVENOUS at 07:02

## 2025-02-03 RX ADMIN — DEXAMETHASONE SODIUM PHOSPHATE 8 MG: 4 INJECTION, SOLUTION INTRAMUSCULAR; INTRAVENOUS at 07:02

## 2025-02-03 RX ADMIN — ONDANSETRON HYDROCHLORIDE 4 MG: 2 INJECTION INTRAMUSCULAR; INTRAVENOUS at 07:02

## 2025-02-03 RX ADMIN — FENTANYL CITRATE 100 MCG: 50 INJECTION INTRAMUSCULAR; INTRAVENOUS at 02:02

## 2025-02-03 RX ADMIN — LIDOCAINE HYDROCHLORIDE 100 MG: 20 INJECTION, SOLUTION INTRAVENOUS at 07:02

## 2025-02-03 RX ADMIN — FAMOTIDINE 20 MG: 20 TABLET, FILM COATED ORAL at 06:02

## 2025-02-03 NOTE — DISCHARGE INSTRUCTIONS
Anesthesia: After Your Surgery  Youve just had surgery. During surgery, you received medication called anesthesia to keep you comfortable and pain-free. After surgery, you may experience some pain or nausea. This is common. Here are some tips for feeling better and recovering after surgery.    Going home  Your doctor or nurse will show you how to take care of yourself when you go home. He or she will also answer your questions. Have an adult family member or friend drive you home. For the first 24 hours after your surgery:  Do not drive or use heavy equipment.  Do not make important decisions or sign legal documents.  Avoid alcohol.  Have someone stay with you, if needed. He or she can watch for problems and help keep you safe.  Take your time getting up from a seated or lying position. You may experience dizziness for 24 hours  Be sure to keep all follow-up appointments with your doctor. And rest after your procedure for as long as your doctor tells you to.    Coping with pain  If you have pain after surgery, pain medication will help you feel better. Take it as directed, before pain becomes severe. Also, ask your doctor or pharmacist about other ways to control pain, such as with heat, ice, and relaxation. And follow any other instructions your surgeon or nurse gives you.    URINARY RETENTION  Should you experience a decrease in your urine output or are unable to urinate following surgery, this can be due to the medications given during surgery.  We recommend you going to the nearest Emergency Department.    Tips for taking pain medication  To get the best relief possible, remember these points:  Pain medications can upset your stomach. Taking them with a little food may help.  Most pain relievers taken by mouth need at least 20 to 30 minutes to take effect.  Taking medication on a schedule can help you remember to take it. Try to time your medication so that you can take it before beginning an activity, such  as dressing, walking, or sitting down for dinner.  Constipation is a common side effect of pain medications. Contact your doctor before taking any medications like laxatives or stool softeners to help relieve constipation. Also ask about any dietary restrictions, because drinking lots of fluids and eating foods like fruits and vegetables that are high in fiber can also help. Remember, dont take laxatives unless your surgeon has prescribed them.  Mixing alcohol and pain medication can cause dizziness and slow your breathing. It can even be fatal. Dont drink alcohol while taking pain medication.  Pain medication can slow your reflexes. Dont drive or operate machinery while taking pain medication.  If your health care provider tells you to take acetaminophen to help relieve your pain, ask him or her how much you are supposed to take each day. (Acetaminophen is the generic name for Tylenol and other brand-name pain relievers.) Acetaminophen or other pain relievers may interact with your prescription medicines or other over-the-counter (OTC) drugs. Some prescription medications contain acetaminophen along with other active ingredients. Using both prescription and OTC acetaminophen for pain can cause you to overdose. The FDA recommends that you read the labels on your OTC medications carefully. This will help you to clearly understand the list of active ingredients, dosing instructions, and any warnings. It may also help you avoid taking too much acetaminophen. If you have questions or don't understand the information, ask your pharmacist or health care provider to explain it to you before you take the OTC medication.    Managing nausea  Some people have an upset stomach after surgery. This is often due to anesthesia, pain, pain medications, or the stress of surgery. The following tips will help you manage nausea and get good nutrition as you recover. If you were on a special diet before surgery, ask your doctor if you  should follow it during recovery. These tips may help:  Dont push yourself to eat. Your body will tell you when to eat and how much.  Start off with clear liquids and soup. They are easier to digest.  Progress to semi-solid foods (mashed potatoes, applesauce, and gelatin) as you feel ready.  Slowly move to solid foods. Dont eat fatty, rich, or spicy foods at first.  Dont force yourself to have three large meals a day. Instead, eat smaller amounts more often.  Take pain medications with a small amount of solid food, such as crackers or toast to avoid nausea.      Call your surgeon if    You feel too sleepy, dizzy, or groggy (medication may be too strong).  You have side effects like nausea, vomiting, or skin changes (rash, itching, or hives).   © 1477-7984 [employer]. [address] [place] [address]. All rights reserved. This information is not intended as a substitute for professional medical care. Always follow your healthcare professional's instructions.      An indwelling urinary catheter is a flexible plastic tube that is inserted through the opening that carries urine from the  bladder to the outside of the body (urethra), to drain urine. The tube is kept in place by a small balloon that is inflated  once the tube is securely in the bladder. Urine drains into a bag that is attached to the thigh through this catheter.      To care for your urinary catheter at home:     Make sure that urine is flowing out of the catheter into the drainage bag.   Check the area around the insertion site for signs of infections (such as inflammation and irritated/swollen/  red/tender skin), and/or leakage of urine around the catheter   Keep the urinary drainage bag below the level of the bladder (to prevent backflow into the bladder).   Make sure that the urinary drainage bag does not drag and pull on the catheter.    Caring for your catheter:     Clean the area around the drainage tube twice each  day.   Use a mild soap and water around the drainage tube.   Rinse well and dry with a clean towel.      Draining the urine collection bag:    The bag that collects urine may be strapped to your thigh. You will need to empty the bag at regular intervals,  typically every 2 to 4 hours, or whenever the catheter bag is half-full, and at bedtime.   Wash your hands with soap and water. If you are emptying another persons catheter bag, you may wish to wear  disposable gloves. Wash your hands before you put the gloves on and after removing them.

## 2025-02-03 NOTE — CARE UPDATE
Resident to bedside with upper level Tati Zepeda as RN reports continued pain and failed void trial.     After leon was removed, bladder backfilled 300 and patient was unable to void after 35 mintues, leon was replaced. Per RN, patient had uncontrolled pain. Anesthesia called to bedside and gave 100mcg of fentanyl.     Prior to fentanyl patient received Toradol 30mg, Oxycodone 5mgx2 and Tylenol 1 gram with only minimal improvement.     Temp:  [98 °F (36.7 °C)-98.5 °F (36.9 °C)] 98 °F (36.7 °C)  Pulse:  [63-82] 77  Resp:  [16-18] 16  SpO2:  [97 %-100 %] 100 %  BP: (109-140)/(66-88) 117/80 '    Upon arrival, patient resting comfortably in bed. Notes intense lower pelvic pressure and pain. No nausea or vomiting. Reports passing a few quarter-sized clots passed when she got out of bed.     Exam: Abdomen soft, mildly diffusely tender. No rebound or guarding. Non-acute abdomen. Leon in place draining approximately 250 cc's of clear yellow urine.     Plan:   - Patient discussed with Dr. Diaz  - Will continue to closely monitor and re-attempt void trial at 1600  - Plan communicated with nurse    Nemo Leach MD  OBGYN   PGY-1    
6

## 2025-02-03 NOTE — H&P
[facility]  Obstetrics & Gynecology  History & Physical    Patient Name: Shara Cordoba  MRN: [id number]  Admission Date: 2/3/2025  Primary Care Provider: Gisela Giordano MD    Subjective:     History of Present Illness: Patient is a 43 yo female who presents for scheduled VNOTES vaginal hysterectomy and BL salpingectomy for pelvic pressure and dyspareunia in the setting of fibroids. She is doing well today without complaints.     No current facility-administered medications on file prior to encounter.     Current Outpatient Medications on File Prior to Encounter   Medication Sig    biotin 10,000 mcg Cap Take 1 capsule by mouth Daily.    cholecalciferol, vitamin D3, (VITAMIN D3) 50 mcg (2,000 unit) Tab     Lactobacillus acidophilus (PROBIOTIC ORAL) Take by mouth. Raw probiotic vaginal care  50 billion cfu    levothyroxine (SYNTHROID) 88 MCG tablet Take 88 mcg by mouth before breakfast.    losartan (COZAAR) 100 MG tablet Take 100 mg by mouth once daily.    magnesium oxide (MAG-OX) 400 mg (241.3 mg magnesium) tablet Take 400 mg by mouth once daily.    nebivoloL (BYSTOLIC) 10 MG Tab Take 10 mg by mouth 2 (two) times a day.    pantoprazole (PROTONIX) 40 MG tablet Take 40 mg by mouth once daily.    ketorolac (TORADOL) 10 mg tablet Take 10 mg by mouth every 6 (six) hours.       Review of patient's allergies indicates:   Allergen Reactions    Amlodipine Edema, Other (See Comments) and Swelling    Meloxicam Edema, Other (See Comments), Palpitations and Swelling    Bactrim [sulfamethoxazole-trimethoprim] Other (See Comments)     Persistant yeast infection     Pcn [penicillins]      Unsure of reaction, was a child    Prednisone      High blood pressure        Past Medical History:   Diagnosis Date    GERD (gastroesophageal reflux disease)     Hypertension     Hypothyroidism     Peptic ulcer     Wears contact lenses      OB History    Para Term  AB Living   0 0 0 0 0 0   SAB IAB Ectopic  Multiple Live Births   0 0 0 0 0     Past Surgical History:   Procedure Laterality Date    COLONOSCOPY      CYSTOSCOPY      ESOPHAGOGASTRODUODENOSCOPY      HIP SURGERY Left     HYSTEROSCOPIC POLYPECTOMY OF UTERUS N/A 12/1/2023    Procedure: POLYPECTOMY, UTERUS, HYSTEROSCOPIC;  Surgeon: Marita Dupont MD;  Location: [facility] OR;  Service: OB/GYN;  Laterality: N/A;    HYSTEROSCOPY WITH DILATION AND CURETTAGE OF UTERUS N/A 12/1/2023    Procedure: HYSTEROSCOPY, WITH DILATION AND CURETTAGE OF UTERUS;  Surgeon: Marita Dupont MD;  Location: [facility] OR;  Service: OB/GYN;  Laterality: N/A;  w/TrueClear  2nd case per Moraima    PARATHYROIDECTOMY      THERMAL ABLATION OF ENDOMETRIUM USING HYSTEROSCOPY N/A 12/1/2023    Procedure: ABLATION, ENDOMETRIUM, THERMAL, HYSTEROSCOPIC;  Surgeon: Marita Dupont MD;  Location: [facility] OR;  Service: OB/GYN;  Laterality: N/A;     Family History       Problem Relation (Age of Onset)    Breast cancer Maternal Grandmother    No Known Problems Mother, Father          Tobacco Use    Smoking status: Never    Smokeless tobacco: Never   Substance and Sexual Activity    Alcohol use: Yes     Comment: occ.    Drug use: No    Sexual activity: Yes     Partners: Male     Birth control/protection: Partner-Vasectomy     Review of Systems   Constitutional:  Negative for fever.   Respiratory:  Negative for shortness of breath.    Cardiovascular:  Negative for chest pain.   Gastrointestinal:  Negative for abdominal pain, nausea and vomiting.   Genitourinary:  Negative for vaginal bleeding.   Neurological:  Negative for headaches.     Objective:     Vital Signs (Most Recent):  Temp: 98.3 °F (36.8 °C) (02/03/25 0607)  Pulse: 63 (02/03/25 0607)  Resp: 18 (02/03/25 0607)  BP: (!) 140/88 (02/03/25 0607)  SpO2: 99 % (02/03/25 0607) Vital Signs (24h Range):  Temp:  [98.3 °F (36.8 °C)] 98.3 °F (36.8 °C)  Pulse:  [63] 63  Resp:  [18] 18  SpO2:  [99 %] 99 %  BP: (140)/(88) 140/88     Weight: 78 kg (172 lb)  Body  mass index is 31.46 kg/m².  Patient's last menstrual period was 01/28/2025.    Physical Exam        Diagnostic Results:  US Pelvis Comp with Transvag NON-OB (xpd  Order: [id number]  Status: Final result       Visible to patient: Yes (seen)       Next appt: None       Dx: Intramural, submucous, and subserous ...    0 Result Notes  Details    Reading Physician Reading Date Result Priority   Cata Singh MD  [phone] 12/30/2024 Routine     Narrative & Impression  EXAMINATION:  PELVIC ULTRASOUND     CLINICAL HISTORY:  Intramural leiomyoma of uterusfibroid uterus;     TECHNIQUE:  Real-time ultrasound of the pelvis was performed transabdominally and transvaginally.     COMPARISON:  None.     FINDINGS:  The uterus measures 8.2 x 5.4 x 5.3.  The endometrial stripe measures 7 mm.  There are 3 measured uterine fibroids.  One posterior fundal uterine fibroid measures 7.9 x 6.9 x 6.8 cm.  There is a mid uterine fibroid measuring 4.7 x 5.2 x 4.4 cm.  There is an anterior fundal fibroid measuring 3.8 x 3.2 x 2.9 cm.     The right ovary measures 3.2 x 2.1 x 2.8 cm. Vascular flow is present.     The left ovary measures 2.9 x 1.0 x 2.1 cm. Vascular flow is present.     There is no free fluid in the pelvis.     Impression:     Multiple uterine leiomyomas.        Electronically signed by:Cata Singh  Date:                                            12/30/2024  Time:                                           18:10       Assessment/Plan:     Active Diagnoses:    Diagnosis Date Noted POA    Intramural, submucous, and subserous leiomyoma of uterus [D25.1, D25.0, D25.2] 02/03/2025 Unknown      Problems Resolved During this Admission:       Fibroid uterus  - 43 yo female who presents for scheduled VNOTES vaginal hysterectomy and BL salpingectomy for pelvic pressure and dyspareunia in the setting of fibroids  - VSS  - UPT negative  - Consents signed after discussion of risks vs benefits by Dr. Joy this AM  - To OR      Tati Zepeda MD  Obstetrics & Gynecology  [facility] - Surgery ([place])

## 2025-02-03 NOTE — OR NURSING
Dr Zepeda and Dr Leach at bedside. Patient states pain better.  Will continue to  monitor for  another hour per Dr Zepead.

## 2025-02-03 NOTE — TELEPHONE ENCOUNTER
----- Message from Lima sent at 2/3/2025  8:39 AM CST -----  Pt  (Kvng) stopped by the office wanted to know if his wife ELIZABETH callaway was ready he wanted to speak with someone in the office he can be reached at [phone]

## 2025-02-03 NOTE — ANESTHESIA POSTPROCEDURE EVALUATION
Anesthesia Post Evaluation    Patient: Shara Cordoba    Procedure(s) Performed: Procedure(s) (LRB):  HYSTERECTOMY,VAGINAL,LAPAROSCOPY-ASSISTED,WITH SALPINGECTOMY (Bilateral)    Final Anesthesia Type: general      Patient location during evaluation: PACU  Patient participation: Yes- Able to Participate  Level of consciousness: awake and alert  Post-procedure vital signs: reviewed and stable  Pain management: adequate  Airway patency: patent    PONV status at discharge: No PONV  Anesthetic complications: no      Cardiovascular status: blood pressure returned to baseline  Respiratory status: unassisted  Hydration status: euvolemic  Follow-up not needed.              Vitals Value Taken Time   /70 02/03/25 1116   Temp 36.9 °C (98.5 °F) 02/03/25 1026   Pulse 68 02/03/25 1118   Resp 16 02/03/25 1100   SpO2 97 % 02/03/25 1118   Vitals shown include unfiled device data.      No case tracking events are documented in the log.      Pain/Marlen Score: Pain Rating Prior to Med Admin: 7 (2/3/2025 11:02 AM)  Marlen Score: 9 (2/3/2025 11:00 AM)

## 2025-02-03 NOTE — OP NOTE
OPERATIVE NOTE:  LAPAROSCOPICALLY ASSISTED VAGINAL HYSTERECTOMY ; BILATERAL SALPINGECTOMY VIA VNOTE TECHNIQUE    DATE OF PROCEDURE:   02/03/2025      PREOPERATIVE DIAGNOSIS:  Abnormal bleeding, fibroid uterus     POSTOPERATIVE DIAGNOSIS:  Abnormal bleeding, fibroid uterus     PROCEDURE:  1)  Laparoscopically assisted vaginal hysterectomy via VNOTES; 2) bilateral salpingectomy,     PRIMARY SURGEON:  Klaus Daiz M.D.     PRIMARY RESIDENT:  Tati Zepeda MD (RES); Julisa Hamilton MD (Assisting attending)     ANESTHESIA:  General endotracheal anesthesia.     ESTIMATED BLOOD LOSS:  Approximately 200 mL.     COMPLICATIONS:  None.     SPECIMENS:  Uterus and cervix, bilateral fallopian tubes.     FINDINGS:  The patient had an 18 cm mobile uterus with normal-appearing ovaries bilaterally.  At the conclusion of the case, hemostasis with motility of both ureters and no injury to the bladder.     STATEMENT OF MEDICAL NECESSITY:  The patient is a 44 y.o. female who has been having abnormal bleeding, unresponsive to medical therapy.  After discussing the risks, benefits, indications and alternatives, the plan was to proceed with the above-stated finding.     PROCEDURE IN DETAIL:  After informed consent was obtained, the patient was taken  to the Operating Room, at which time general anesthesia was administered.  The patient was placed in the Jaun stirrups and prepped and draped in the usual fashion.  A Fofana catheter was placed to aid with bladder decompression.  Next, a weighted speculum was placed and 2 Damaris thyroid clamps were used to grasp the anterior and posterior aspects of the cervix.  Using the tenotomy scissors, a circumferential full thickness vaginal incision was made.  The bladder was reflected off the lower uterine segment anteriorly.  Attention was taken posteriorly at which time the cul-de-sac was entered sharply using the tenotomy scissors.  The peritoneum was tagged to the vagina.  Next, using a curved Stacie  retractor, the uterosacral ligaments were clamped, cut and ligated bilaterally.  The bladder was further reflected and the cardinal ligaments were clamped, cut and ligated bilaterally.  Next, attention was taken anteriorly which time the vesicouterine fold was clearly identified.  Using the tenotomy scissors, a sharp entry was made.  There was   confirmation that no injury to the bladder at that time.  At this time, the VNOTES Gel-port was placed anteriorly and posteriorly.  The abdomen was insufflated to 12mmHg after confirming proper placement.    Using atraumatic graspers and the Ligasure, attention was first taken to the left cardinal ligament which was desiccated and transected.  Next, the uterine artery was desiccated and transected. The broad ligament was then desiccated and transected.  Finally, the utero-ovarian ligament was desiccated but not transected.    Attention was then taken to the right side where the right cardinal ligament which was desiccated and transected.  Next, the uterine artery was desiccated and transected. The broad ligament was then desiccated and transected.  Finally, the utero-ovarian ligament was desiccated but and then transected.  Attention was taken back to the left side at which time the utero-ovarian ligament was finally transected.    All pedicles were re-inspected and confirmed to be hemostatic.  The uterus was used to displace the bowel cephalad.  The fallopian tubes were identified and removed using the Ligasure without any issues.  The gel port was then removed and the pelvis was irrigated.  The uterus was removed using 2 kai retractors and performing a myomectomy.  Next, a Funes angle stitches were placed in the bilateral angles and the vagina was closed in a running locking fashion.  A few interrupted sutures were placed in the periurethral area using 3-0 Vicryl suture (trauma from uterus removal). The patient was taken  out of the [facility], extubated and  taken to Recovery Room in stable condition.  All laps, needles, and sponge counts were correct x2 at the conclusion of the case.  The patient did receive preoperative antibiotics.    I was scrubbed and present for the entire procedure providing direct supervision.    Thanks,     Klaus Diaz MD

## 2025-02-03 NOTE — TRANSFER OF CARE
"Anesthesia Transfer of Care Note    Patient: Shara Cordoba    Procedure(s) Performed: Procedure(s) (LRB):  HYSTERECTOMY,VAGINAL,LAPAROSCOPY-ASSISTED,WITH SALPINGECTOMY (Bilateral)    Patient location: PACU    Anesthesia Type: general    Transport from OR: Transported from OR on 6-10 L/min O2 by face mask with adequate spontaneous ventilation    Post pain: adequate analgesia    Post assessment: tolerated procedure well and no apparent anesthetic complications    Post vital signs: stable    Level of consciousness: awake, alert and oriented    Nausea/Vomiting: no nausea/vomiting    Complications: none    Transfer of care protocol was followed      Last vitals: Visit Vitals  /75 (BP Location: Right arm, Patient Position: Lying)   Pulse 76   Temp 36.9 °C (98.5 °F) (Temporal)   Resp 16   Ht 5' 2" (1.575 m)   Wt 78 kg (172 lb)   LMP 01/28/2025   SpO2 100%   Breastfeeding No   BMI 31.46 kg/m²     "

## 2025-02-03 NOTE — ANESTHESIA PROCEDURE NOTES
Intubation    Date/Time: 2/3/2025 7:34 AM    Performed by: Luz Marina Pyle CRNA  Authorized by: Luz Marina Pyle CRNA    Intubation:     Induction:  Intravenous    Intubated:  Postinduction    Mask Ventilation:  Easy mask    Attempts:  1    Attempted By:  CRNA    Method of Intubation:  Video laryngoscopy    Blade:  Garcia 3    Laryngeal View Grade: Grade I - full view of cords      Difficult Airway Encountered?: No      Complications:  None    Airway Device:  Oral endotracheal tube    Airway Device Size:  7.5    Style/Cuff Inflation:  Cuffed (inflated to minimal occlusive pressure)    Tube secured:  23    Secured at:  The lips    Placement Verified By:  Capnometry    Complicating Factors:  None    Findings Post-Intubation:  BS equal bilateral and atraumatic/condition of teeth unchanged

## 2025-02-03 NOTE — DISCHARGE SUMMARY
"Discharge Summary  Gynecology      Admit Date: 2/3/2025    Discharge Date and Time: 2/3/2025     Attending Physician: Klaus Diaz MD    Principal Diagnoses:   S/P VH (vaginal hysterectomy)    Active Hospital Problems    Diagnosis  POA    *S/P VNOTES (vaginal hysterectomy, R salpingectomy, L fimbria removal) [Z90.710]  No      Resolved Hospital Problems    Diagnosis Date Resolved POA    Intramural, submucous, and subserous leiomyoma of uterus [D25.1, D25.0, D25.2] 2025 Yes       Procedures:   Procedure(s) (LRB):  HYSTERECTOMY,VAGINAL,LAPAROSCOPY-ASSISTED,WITH SALPINGECTOMY (Bilateral)    Discharged Condition: good    Hospital Course:   Shara Cordoba is a 44 y.o. y.o.  female who presented on 2/3/2025 for the above-listed procedures for the treatment of pelvic pressure and dyspareunia in the setting of fibroids.     PMH is significant for HTN, GERD and hypothyroidism.     Patient tolerated the procedure well and was admitted for post-operative care. Post-operative course was uncomplicated.    On day of discharge (POD#0), patient was in stable condition, having met all post-operative milestones. She was  ambulating, and tolerating a regular diet without nausea/vomiting. Pain was well-controlled on oral medication. She was discharged with medications and follow up as listed below. Patient did not pass void trial x2 and was discharge home with a leon.     Consults: None    Significant Diagnostic Studies:  No results for input(s): "WBC", "HGB", "HCT", "MCV", "PLT" in the last 168 hours.     Treatments:   1. Surgery as above    Disposition: Home or Self Care    Patient Instructions:   Current Discharge Medication List        START taking these medications    Details   acetaminophen (TYLENOL) 650 MG TbSR Take 1 tablet (650 mg total) by mouth every 8 (eight) hours.  Qty: 30 tablet, Refills: 0      docusate sodium (COLACE) 100 MG capsule Take 1 capsule (100 mg total) by mouth 2 (two) times " daily.  Qty: 60 capsule, Refills: 0      nitrofurantoin, macrocrystal-monohydrate, (MACROBID) 100 MG capsule Take 1 capsule (100 mg total) by mouth 2 (two) times daily. for 5 days  Qty: 10 capsule, Refills: 0      oxyCODONE (ROXICODONE) 5 MG immediate release tablet Take 1 tablet (5 mg total) by mouth every 4 (four) hours as needed.  Qty: 20 tablet, Refills: 0    Comments: Quantity prescribed more than 7 day supply? No  Associated Diagnoses: S/P VH (vaginal hysterectomy)      phenazopyridine (PYRIDIUM) 100 MG tablet Take 2 tablets (200 mg total) by mouth 3 (three) times daily as needed for Pain.  Qty: 20 tablet, Refills: 0           CONTINUE these medications which have NOT CHANGED    Details   biotin 10,000 mcg Cap Take 1 capsule by mouth Daily.      cholecalciferol, vitamin D3, (VITAMIN D3) 50 mcg (2,000 unit) Tab       hydroCHLOROthiazide (HYDRODIURIL) 25 MG tablet Take 25 mg by mouth.      Lactobacillus acidophilus (PROBIOTIC ORAL) Take by mouth. Raw probiotic vaginal care  50 billion cfu      levothyroxine (SYNTHROID) 88 MCG tablet Take 88 mcg by mouth before breakfast.      losartan (COZAAR) 100 MG tablet Take 100 mg by mouth once daily.      magnesium oxide (MAG-OX) 400 mg (241.3 mg magnesium) tablet Take 400 mg by mouth once daily.      nebivoloL (BYSTOLIC) 10 MG Tab Take 10 mg by mouth 2 (two) times a day.      pantoprazole (PROTONIX) 40 MG tablet Take 40 mg by mouth once daily.      ketorolac (TORADOL) 10 mg tablet Take 10 mg by mouth every 6 (six) hours.             Discharge Procedure Orders   Diet general     Lifting restrictions   Order Comments: No lifting greater than 15 pounds for six weeks.     Other restrictions (specify):   Order Comments: PELVIC REST:  No douching, tampons, or intercourse for 6 weeks.    If prescribed, vaginal estrogen cream may be used during the postoperative period.     DRIVING:  No driving while on narcotics. Driving may be resumed initially with a competent passenger one to  two weeks after surgery if no longer taking narcotics.     EXERCISE:  For six weeks your exercise should be limited to walking. You may walk as far as you wish, as long as you increase your level of exertion gradually and avoid slippery surfaces. You may climb stairs as needed to get around, but should not use stair climbing for exercise.     Remove dressing in 24 hours   Order Comments: If you have a bandage on wound, you may remove it the day after dismissal.  If you had steri-strips remove them once they begin to peel off (usually 2 weeks). Keep incision clean and dry.  Inspect the incision daily for signs and symptoms of infection.     Wound care routine (specify)   Order Comments: WOUND CARE:  If you have a band-aid or bandage on your wound, you may remove it the day after dismissal.  If you had steri-strips remove them once they begin to peel off (usually 2 weeks).  If your steri-strips still haven't come off in 2 weeks, please remove them. You may wash the wound with mild soap and water.   You may shower at any time but should avoid immersing any abdominal incisions in water for at least two weeks after surgery or until the wound is completely healed. If given, please shower with Hibiclens soap until bottle is completely finished. Keep your wound clean and dry.  You should observe your incision for signs of infection which include redness, warmth, drainage or fever.     Call MD for:  temperature >100.4     Call MD for:  persistent nausea and vomiting     Call MD for:  severe uncontrolled pain     Call MD for:  difficulty breathing, headache or visual disturbances     Call MD for:  redness, tenderness, or signs of infection (pain, swelling, redness, odor or green/yellow discharge around incision site)     Call MD for:  hives     Call MD for:   Order Comments: inability to void,urine is ketchup colored or you have large clots, vaginal bleeding is heavier than a period.    VAGINAL DISCHARGE: You may develop a  vaginal discharge and intermittent vaginal spotting after surgery and up to 6 weeks postoperatively.  The discharge may have an odor and may change in color but it is normal.  This is due to dissolving stiches.  Contact your surgical team if you develop vaginal or vulvar irritation along with a discharge.  Also contact your surgical team if you have vaginal discharge that smells like urine or stool.    CONSTIPATION REMEDIES: Patients are often constipated after surgery or with use of oral narcotic medicine. You should continue to take the stool softener, Senokot-S during the next six weeks, and consume adequate amounts of water.  If you have not had a bowel movement for 3 days after dismissal, or are uncomfortable and unable to pass stool, please try one or all of the following measures:  1.  Milk of Magnesia - 30 cc by mouth every 12 hours   2.  Dulcolax suppository - One suppository per rectum every 4-6 hours   3.  Metamucil, Fibercon or other bulk former - use as directed  4.  Fleets Enema        PAIN MEDICATIONS:     Take your pain medications as instructed. It is best to take pain medications before your pain becomes severe. This will allow you to take less medication yet have better pain relief. For the first 2 or 3 days it may be helpful to take your pain medications on a regular schedule (e.g. every 4 to 6 hours). This will help you to keep your pain under better control. You should then begin to take fewer medications each day until you no longer need them. Do not take pain medication on an empty stomach. This may lead to nausea and vomiting.     Activity as tolerated   Order Comments: Return to normal activity slowly as you feel able.  For 6 weeks your exercise should be limited to walking.  You may walk as far as you wish, as long as you increase your level of exertion gradually and avoid slippery surfaces.    If you had a hysterectomy at the surgery do not insert anything in your vagina for 9 weeks.      Shower on day dressing removed (No bath)   Order Comments: You may shower at any time but should avoid immersing any abdominal incisions in water for at least 2 weeks after surgery or until the wound is completely healed.  If given, please shower with Hibaclens soap until bottle is completely finish        Follow-up Information       Klaus Diaz MD Follow up in 6 week(s).    Specialties: Obstetrics, Obstetrics and Gynecology  Why: Post-op follow-up  Contact information:  [address] [address] [phone]                           Tati Zepeda MD  PGY-4 OB/GYN

## 2025-02-04 ENCOUNTER — NURSE TRIAGE (OUTPATIENT)
Dept: ADMINISTRATIVE | Facility: CLINIC | Age: 45
End: 2025-02-04
Payer: COMMERCIAL

## 2025-02-04 ENCOUNTER — TELEPHONE (OUTPATIENT)
Dept: OBSTETRICS AND GYNECOLOGY | Facility: CLINIC | Age: 45
End: 2025-02-04
Payer: COMMERCIAL

## 2025-02-04 VITALS
BODY MASS INDEX: 31.65 KG/M2 | OXYGEN SATURATION: 98 % | HEART RATE: 75 BPM | DIASTOLIC BLOOD PRESSURE: 65 MMHG | WEIGHT: 172 LBS | TEMPERATURE: 98 F | HEIGHT: 62 IN | RESPIRATION RATE: 16 BRPM | SYSTOLIC BLOOD PRESSURE: 110 MMHG

## 2025-02-04 NOTE — TELEPHONE ENCOUNTER
Spoke with pt and doing well after surgery.  Pain improved.  Just has irritation from the leon.  Discussed voiding trial tomorrow.  Will coordinate that appointment.

## 2025-02-04 NOTE — TELEPHONE ENCOUNTER
----- Message from Boo Joy sent at 2/3/2025  6:35 PM CST -----  Hi, this patient had a VNOTES today, but failed her void trial, she was discharged home with a leon. Please schedule her for a void trial in clinic. Thank you!    Boo Joy MD  Obstetrics and Gynecology- PGY2

## 2025-02-04 NOTE — TELEPHONE ENCOUNTER
" calling on behalf of wife. Wife can be heard speaking in back ground. Pt had hysterectomy yesterday. Wants to take Gas-X.  "Waves of pain" under ribs/ stomach- lasts 5-10 sec.  Some of those pains feels like gas pain. Describes the waves of pain as sharp pain. states they left a message with provider office about the pain but thinks office may be at lunch.  All other pain is controlled. No vomiting. No fever. HA earlier but resolved after taking Tylenol. Care advice per protocol. Advised ok to take Gas X with new meds prescribed yesterday (pain med and abx). Advised will route to provider asking for out reach to discuss concerns about intermittent "waves of pain." No additional concerns     Reason for Disposition   SEVERE post-op pain (e.g., excruciating, pain scale 8-10) that is not controlled with pain medications    Additional Information   Negative: Nursing judgment   Negative: Nursing judgment   Negative: Sounds like a life-threatening emergency to the triager   Negative: Bright red, wide-spread, sunburn-like rash   Negative: SEVERE headache (e.g., excruciating) and after spinal (epidural) anesthesia   Negative: Vomiting and persists > 4 hours   Negative: Vomiting and abdomen looks much more swollen than usual   Negative: Drinking very little and dehydration suspected (e.g., no urine > 12 hours, very dry mouth, very lightheaded)   Negative: Patient sounds very sick or weak to the triager   Negative: Sounds like a serious complication to the triager   Negative: Fever > 100.4 F (38.0 C)   Negative: Caller has URGENT question and triager unable to answer question    Protocols used: Information Only Call - No Triage-A-OH, Post-Op Symptoms and Eumffgbyb-I-BQ    "

## 2025-02-04 NOTE — TELEPHONE ENCOUNTER
----- Message from Sarah sent at 2/4/2025 11:56 AM CST -----  Regarding: post op ques  Name of Who is Calling: Kvng [phone]          What is the request in detail:pt had surgery yesterday. Her  is calling bc she is having some pressure and pain  and would like a call back to discuss. Please call to advise           Can the clinic reply by [facility]:          [facility]: [phone]

## 2025-02-04 NOTE — TELEPHONE ENCOUNTER
Called pt in regards to post-op  concerns and triage note, questions answered, pt verbalized understanding.

## 2025-02-04 NOTE — PLAN OF CARE
Shara Cordoba has met all discharge criteria from Phase II. Vital Signs are stable, ambulating  without difficulty.Pain is now under control and tolerable for the pt. Pain score 4 at this time.  Discharge instructions given, patient verbalized understanding. Discharged from facility via wheelchair in stable condition.

## 2025-02-05 ENCOUNTER — TELEPHONE (OUTPATIENT)
Dept: OBSTETRICS AND GYNECOLOGY | Facility: CLINIC | Age: 45
End: 2025-02-05

## 2025-02-05 ENCOUNTER — OFFICE VISIT (OUTPATIENT)
Dept: OBSTETRICS AND GYNECOLOGY | Facility: CLINIC | Age: 45
End: 2025-02-05
Payer: COMMERCIAL

## 2025-02-05 ENCOUNTER — TELEPHONE (OUTPATIENT)
Dept: OBSTETRICS AND GYNECOLOGY | Facility: CLINIC | Age: 45
End: 2025-02-05
Payer: COMMERCIAL

## 2025-02-05 ENCOUNTER — NURSE TRIAGE (OUTPATIENT)
Dept: ADMINISTRATIVE | Facility: CLINIC | Age: 45
End: 2025-02-05
Payer: COMMERCIAL

## 2025-02-05 DIAGNOSIS — Z90.710 S/P VH (VAGINAL HYSTERECTOMY): ICD-10-CM

## 2025-02-05 DIAGNOSIS — R33.8 ACUTE URINARY RETENTION: ICD-10-CM

## 2025-02-05 DIAGNOSIS — G89.18 POSTOPERATIVE PAIN: Primary | ICD-10-CM

## 2025-02-05 PROCEDURE — 99024 POSTOP FOLLOW-UP VISIT: CPT | Mod: S$GLB,,, | Performed by: OBSTETRICS & GYNECOLOGY

## 2025-02-05 PROCEDURE — 99999 PR PBB SHADOW E&M-EST. PATIENT-LVL I: CPT | Mod: PBBFAC,,, | Performed by: OBSTETRICS & GYNECOLOGY

## 2025-02-05 RX ORDER — OXYCODONE HYDROCHLORIDE 5 MG/1
5 TABLET ORAL EVERY 4 HOURS PRN
Qty: 10 TABLET | Refills: 0 | Status: SHIPPED | OUTPATIENT
Start: 2025-02-05 | End: 2025-02-10

## 2025-02-05 NOTE — PROGRESS NOTES
S:  doing well.  Pain controlled.  Fofana is uncomfortable.  Exam:  Clear urine draining.  Removed and back-filled with 300cc sterile water.  Able to void 400cc.  Plan  Passed voiding trial.  Discussed bladder training.  Stay hydrated.  Extra pain medication sent.

## 2025-02-05 NOTE — TELEPHONE ENCOUNTER
----- Message from Aixa sent at 2/5/2025 10:46 AM CST -----  Pt called in stating she was seen to today, and got a cathter removed, and she is now expiereincing pain after urinating. Pls call and advise.

## 2025-02-05 NOTE — TELEPHONE ENCOUNTER
----- Message from Crystal sent at 2/5/2025  8:10 AM CST -----  Regarding: assistance.  Name of Who is Calling: Josephine           What is the request in detail: Patient is requesting a call call back. She was told to call when she get in the garage for a wheelchair. She been waiting since 8 for transport.            Can the clinic reply by [facility]: Yes           What Number to Call Back [facility] [place]:  [phone]

## 2025-02-05 NOTE — TELEPHONE ENCOUNTER
Spoke to pt about pain she is experiencing.  Able to urinate without issue at home after leon removal.  Good output.  But when straining, began to have bad pain.  Took MOM and cramping got worse.  Last BM midnight before surgery.  Likely all due to constipation.  Discussed strategies to help.  If pain worsens, aware that she should go to local ED for assessment (Sarah, since our Gyn team is there if needed).  All questions answered.

## 2025-02-05 NOTE — TELEPHONE ENCOUNTER
Called pt in regards to post-op concerns. Hey I have to pt on the phone c/o severe pressure/pain with cramps after leon was removed. Pt states she has not had a BM since Sunday. Pt is currently taking colace/Murelax 3x/day and tylenol & oxycodone; hoowever, pain has gotten worse. Provider states he will call pt to discuss further

## 2025-02-06 ENCOUNTER — TELEPHONE (OUTPATIENT)
Dept: OBSTETRICS AND GYNECOLOGY | Facility: CLINIC | Age: 45
End: 2025-02-06
Payer: COMMERCIAL

## 2025-02-06 ENCOUNTER — PATIENT MESSAGE (OUTPATIENT)
Dept: OBSTETRICS AND GYNECOLOGY | Facility: CLINIC | Age: 45
End: 2025-02-06
Payer: COMMERCIAL

## 2025-02-06 PROBLEM — I10 HYPERTENSION: Chronic | Status: ACTIVE | Noted: 2025-02-06

## 2025-02-06 PROBLEM — S37.20XA BLADDER INJURY: Status: ACTIVE | Noted: 2025-02-06

## 2025-02-06 LAB
FINAL PATHOLOGIC DIAGNOSIS: NORMAL
GROSS: NORMAL
Lab: NORMAL

## 2025-02-06 NOTE — TELEPHONE ENCOUNTER
Pt called and stated she had a hysterectomy on 2/3/25 and followed up in clinic today and has loen removed. Pt reports she has urinated several times since leon was removed. Pt reports around 4pm she started having severe pain in her lower abdomen after take ducolax suppository and straining to have BM. Pt reports taking Tylenol at 6pm, ibuprofen at 7:15pm and Oxycodone at 4:15pm for pain with no relief. Care advice recommends discuss with PCP and callback by nurse within 1 hour. Reached out to Dr. Baljeet ALEXANDRA on-call. MD stated for pt to increase Ibuprofen to 600mg and take her dose of oxycodone and see if she can manage the pain, if unable to pt advised to go to ED for evaluation. MD also recommends pt take colace BID to avoid straining with BM. Pt verbalized understanding.  Reason for Disposition   SEVERE post-op pain (e.g., excruciating, pain scale 8-10) that is not controlled with pain medications    Additional Information   Negative: Sounds like a life-threatening emergency to the triager   Negative: Bright red, wide-spread, sunburn-like rash   Negative: SEVERE headache (e.g., excruciating) and after spinal (epidural) anesthesia   Negative: Vomiting and persists > 4 hours   Negative: Vomiting and abdomen looks much more swollen than usual   Negative: Drinking very little and dehydration suspected (e.g., no urine > 12 hours, very dry mouth, very lightheaded)   Negative: Patient sounds very sick or weak to the triager   Negative: Sounds like a serious complication to the triager   Negative: Fever > 100.4 F (38.0 C)   Negative: Caller has URGENT question and triager unable to answer question    Protocols used: Post-Op Symptoms and Gqxpqzivb-E-NS

## 2025-02-06 NOTE — TELEPHONE ENCOUNTER
----- Message from Tres sent at 2/6/2025 10:29 AM CST -----  Regarding:  [phone]  Type: Patient Call Back    Who called:      What is the request in detail: pt called yesterday in regards to some pain the pt has been having since the surgery. Would like a call back.     Can the clinic reply by [facility]? No     Would the patient rather a call back or a response via [facility]? Call back     Best call back number: [phone]    Additional Information:    Thank you.

## 2025-02-06 NOTE — TELEPHONE ENCOUNTER
Spoke with  about her current status.  Urinating seems fine, but pain increased after trying to have a bowel movement yesterday.  Given that the pain is not letting up, agree that going to [facility] ED for assessment is reasonable.  All questions answered.

## 2025-02-07 ENCOUNTER — TELEPHONE (OUTPATIENT)
Dept: OBSTETRICS AND GYNECOLOGY | Facility: CLINIC | Age: 45
End: 2025-02-07
Payer: COMMERCIAL

## 2025-02-07 NOTE — TELEPHONE ENCOUNTER
Spoke with pt in regards to situation at ER in [place]. Informed pt her concerns have been sent to provider. Questions answered.Pt verbalized understanding.

## 2025-02-07 NOTE — TELEPHONE ENCOUNTER
----- Message from Melodie Mejia sent at 2/7/2025  2:52 PM CST -----  Patient is trying to get in touch with  in regards to a surgery that she earlier this week. Patient started have complications in went to hospital in [place] now they would not allow the patient to eat or drink anything until her records released from the other hospital. Patient also states that she have been trying for 2 days to get in touch with Dr. Diaz office for this matter and no has yet return her call or message and she said that this is a Emergency. Patient is suck at this as well because they need her records Please give patient ASAP.    Contact# [phone] Kvng  number  [phone] Patient Sofiya edmond                                                                  Thank You!!!!

## 2025-02-08 ENCOUNTER — ANESTHESIA EVENT (OUTPATIENT)
Dept: SURGERY | Facility: OTHER | Age: 45
DRG: 660 | End: 2025-02-08
Payer: COMMERCIAL

## 2025-02-08 ENCOUNTER — ANESTHESIA (OUTPATIENT)
Dept: SURGERY | Facility: OTHER | Age: 45
DRG: 660 | End: 2025-02-08
Payer: COMMERCIAL

## 2025-02-08 PROCEDURE — 25000003 PHARM REV CODE 250: Performed by: STUDENT IN AN ORGANIZED HEALTH CARE EDUCATION/TRAINING PROGRAM

## 2025-02-08 PROCEDURE — D9220A PRA ANESTHESIA: Mod: CRNA,,, | Performed by: STUDENT IN AN ORGANIZED HEALTH CARE EDUCATION/TRAINING PROGRAM

## 2025-02-08 PROCEDURE — D9220A PRA ANESTHESIA: Mod: ANES,,, | Performed by: ANESTHESIOLOGY

## 2025-02-08 PROCEDURE — 63600175 PHARM REV CODE 636 W HCPCS: Performed by: STUDENT IN AN ORGANIZED HEALTH CARE EDUCATION/TRAINING PROGRAM

## 2025-02-08 RX ORDER — ONDANSETRON HYDROCHLORIDE 2 MG/ML
INJECTION, SOLUTION INTRAVENOUS
Status: DISCONTINUED | OUTPATIENT
Start: 2025-02-08 | End: 2025-02-08

## 2025-02-08 RX ORDER — DEXAMETHASONE SODIUM PHOSPHATE 4 MG/ML
INJECTION, SOLUTION INTRA-ARTICULAR; INTRALESIONAL; INTRAMUSCULAR; INTRAVENOUS; SOFT TISSUE
Status: DISCONTINUED | OUTPATIENT
Start: 2025-02-08 | End: 2025-02-08

## 2025-02-08 RX ORDER — FENTANYL CITRATE 50 UG/ML
INJECTION, SOLUTION INTRAMUSCULAR; INTRAVENOUS
Status: DISCONTINUED | OUTPATIENT
Start: 2025-02-08 | End: 2025-02-08

## 2025-02-08 RX ORDER — CIPROFLOXACIN 2 MG/ML
INJECTION, SOLUTION INTRAVENOUS
Status: DISCONTINUED | OUTPATIENT
Start: 2025-02-08 | End: 2025-02-08

## 2025-02-08 RX ORDER — LIDOCAINE HYDROCHLORIDE 20 MG/ML
INJECTION INTRAVENOUS
Status: DISCONTINUED | OUTPATIENT
Start: 2025-02-08 | End: 2025-02-08

## 2025-02-08 RX ORDER — PROPOFOL 10 MG/ML
VIAL (ML) INTRAVENOUS
Status: DISCONTINUED | OUTPATIENT
Start: 2025-02-08 | End: 2025-02-08

## 2025-02-08 RX ORDER — MIDAZOLAM HYDROCHLORIDE 1 MG/ML
INJECTION INTRAMUSCULAR; INTRAVENOUS
Status: DISCONTINUED | OUTPATIENT
Start: 2025-02-08 | End: 2025-02-08

## 2025-02-08 RX ORDER — PROPOFOL 10 MG/ML
VIAL (ML) INTRAVENOUS CONTINUOUS PRN
Status: DISCONTINUED | OUTPATIENT
Start: 2025-02-08 | End: 2025-02-08

## 2025-02-08 RX ADMIN — DEXAMETHASONE SODIUM PHOSPHATE 8 MG: 4 INJECTION, SOLUTION INTRAMUSCULAR; INTRAVENOUS at 09:02

## 2025-02-08 RX ADMIN — ONDANSETRON HYDROCHLORIDE 4 MG: 2 INJECTION INTRAMUSCULAR; INTRAVENOUS at 09:02

## 2025-02-08 RX ADMIN — PROPOFOL 60 MG: 10 INJECTION, EMULSION INTRAVENOUS at 09:02

## 2025-02-08 RX ADMIN — SODIUM CHLORIDE: 0.9 INJECTION, SOLUTION INTRAVENOUS at 09:02

## 2025-02-08 RX ADMIN — LIDOCAINE HYDROCHLORIDE 50 MG: 20 INJECTION, SOLUTION INTRAVENOUS at 09:02

## 2025-02-08 RX ADMIN — FENTANYL CITRATE 100 MCG: 50 INJECTION, SOLUTION INTRAMUSCULAR; INTRAVENOUS at 09:02

## 2025-02-08 RX ADMIN — MIDAZOLAM HYDROCHLORIDE 2 MG: 1 INJECTION INTRAMUSCULAR; INTRAVENOUS at 09:02

## 2025-02-08 RX ADMIN — PROPOFOL 125 MCG/KG/MIN: 10 INJECTION, EMULSION INTRAVENOUS at 09:02

## 2025-02-08 RX ADMIN — CIPROFLOXACIN 400 MG: 2 INJECTION, SOLUTION INTRAVENOUS at 10:02

## 2025-02-08 RX ADMIN — GLYCOPYRROLATE 0.2 MG: 0.2 INJECTION, SOLUTION INTRAMUSCULAR; INTRAVITREAL at 09:02

## 2025-02-08 NOTE — TRANSFER OF CARE
"Anesthesia Transfer of Care Note    Patient: Shara Cordoba    Procedure(s) Performed: Procedure(s) (LRB):  CYSTOURETEROSCOPY, WITH RETROGRADE PYELOGRAM AND URETERAL STENT INSERTION (Bilateral)    Patient location: PACU    Anesthesia Type: MAC    Transport from OR: Transported from OR on room air with adequate spontaneous ventilation    Post pain: adequate analgesia    Post assessment: no apparent anesthetic complications and tolerated procedure well    Post vital signs: stable    Level of consciousness: awake, alert and oriented    Nausea/Vomiting: no nausea/vomiting    Complications: none    Transfer of care protocol was followed      Last vitals: Visit Vitals  /66 (BP Location: Right arm, Patient Position: Lying)   Pulse (!) 56   Temp 36.7 °C (98.1 °F) (Oral)   Resp 16   Ht 5' 2" (1.575 m)   Wt 80.9 kg (178 lb 5.6 oz)   LMP 01/28/2025   SpO2 95%   Breastfeeding No   BMI 32.62 kg/m²     "

## 2025-02-08 NOTE — ANESTHESIA PREPROCEDURE EVALUATION
02/08/2025  Shara Cordoba is a 44 y.o., female.      Pre-op Assessment    I have reviewed the Patient Summary Reports.     I have reviewed the Nursing Notes. I have reviewed the NPO Status.   I have reviewed the Medications.     Review of Systems  Anesthesia Hx:  No problems with previous Anesthesia             Denies Family Hx of Anesthesia complications.    Denies Personal Hx of Anesthesia complications.                    Social:  Non-Smoker       EENT/Dental:  EENT/Dental Normal           Cardiovascular:     Hypertension, well controlled                                          Pulmonary:  Pulmonary Normal                       Hepatic/GI:   PUD,  GERD, well controlled                Endocrine:   Hypothyroidism        Obesity / BMI > 30  Psych:  Psychiatric Normal                  Physical Exam  General: Cooperative and Alert    Airway:  Mallampati: II   Mouth Opening: Normal  TM Distance: Normal  Tongue: Normal  Neck ROM: Normal ROM    Dental:  Intact, Veneers      Anesthesia Plan  Type of Anesthesia, risks & benefits discussed:    Anesthesia Type: MAC  Intra-op Monitoring Plan: Standard ASA Monitors  Post Op Pain Control Plan: multimodal analgesia  Induction:  IV  Airway Plan: Video  Informed Consent: Informed consent signed with the Patient and all parties understand the risks and agree with anesthesia plan.  All questions answered.   ASA Score: 2  Anesthesia Plan Notes: Recent surgery at [facility]    Ready For Surgery From Anesthesia Perspective.     .

## 2025-02-10 ENCOUNTER — HOSPITAL ENCOUNTER (OUTPATIENT)
Facility: OTHER | Age: 45
Discharge: HOME | End: 2025-02-12
Attending: EMERGENCY MEDICINE | Admitting: OBSTETRICS & GYNECOLOGY
Payer: COMMERCIAL

## 2025-02-10 ENCOUNTER — TELEPHONE (OUTPATIENT)
Dept: UROLOGY | Facility: CLINIC | Age: 45
End: 2025-02-10
Payer: COMMERCIAL

## 2025-02-10 ENCOUNTER — TELEPHONE (OUTPATIENT)
Dept: OBSTETRICS AND GYNECOLOGY | Facility: CLINIC | Age: 45
End: 2025-02-10
Payer: COMMERCIAL

## 2025-02-10 ENCOUNTER — NURSE TRIAGE (OUTPATIENT)
Dept: ADMINISTRATIVE | Facility: CLINIC | Age: 45
End: 2025-02-10
Payer: COMMERCIAL

## 2025-02-10 DIAGNOSIS — N39.0 URINARY TRACT INFECTION ASSOCIATED WITH INDWELLING URETHRAL CATHETER, SEQUELA: ICD-10-CM

## 2025-02-10 DIAGNOSIS — T83.511S URINARY TRACT INFECTION ASSOCIATED WITH INDWELLING URETHRAL CATHETER, SEQUELA: ICD-10-CM

## 2025-02-10 DIAGNOSIS — N30.90 CYSTITIS: Primary | ICD-10-CM

## 2025-02-10 DIAGNOSIS — Z13.6 SCREENING FOR CARDIOVASCULAR CONDITION: ICD-10-CM

## 2025-02-10 LAB
BASOPHILS # BLD AUTO: 0.05 K/UL (ref 0–0.2)
BASOPHILS NFR BLD: 0.4 % (ref 0–1.9)
DIFFERENTIAL METHOD BLD: ABNORMAL
EOSINOPHIL # BLD AUTO: 0.1 K/UL (ref 0–0.5)
EOSINOPHIL NFR BLD: 0.7 % (ref 0–8)
ERYTHROCYTE [DISTWIDTH] IN BLOOD BY AUTOMATED COUNT: 11.9 % (ref 11.5–14.5)
HCT VFR BLD AUTO: 34.7 % (ref 37–48.5)
HGB BLD-MCNC: 11.6 G/DL (ref 12–16)
IMM GRANULOCYTES # BLD AUTO: 0.06 K/UL (ref 0–0.04)
IMM GRANULOCYTES NFR BLD AUTO: 0.5 % (ref 0–0.5)
LYMPHOCYTES # BLD AUTO: 0.9 K/UL (ref 1–4.8)
LYMPHOCYTES NFR BLD: 7.6 % (ref 18–48)
MCH RBC QN AUTO: 31.8 PG (ref 27–31)
MCHC RBC AUTO-ENTMCNC: 33.4 G/DL (ref 32–36)
MCV RBC AUTO: 95 FL (ref 82–98)
MONOCYTES # BLD AUTO: 0.7 K/UL (ref 0.3–1)
MONOCYTES NFR BLD: 5.3 % (ref 4–15)
NEUTROPHILS # BLD AUTO: 10.6 K/UL (ref 1.8–7.7)
NEUTROPHILS NFR BLD: 85.5 % (ref 38–73)
NRBC BLD-RTO: 0 /100 WBC
PLATELET # BLD AUTO: 377 K/UL (ref 150–450)
PMV BLD AUTO: 9 FL (ref 9.2–12.9)
RBC # BLD AUTO: 3.65 M/UL (ref 4–5.4)
WBC # BLD AUTO: 12.39 K/UL (ref 3.9–12.7)

## 2025-02-10 PROCEDURE — 87086 URINE CULTURE/COLONY COUNT: CPT | Performed by: EMERGENCY MEDICINE

## 2025-02-10 PROCEDURE — 87040 BLOOD CULTURE FOR BACTERIA: CPT | Performed by: EMERGENCY MEDICINE

## 2025-02-10 PROCEDURE — 93010 ELECTROCARDIOGRAM REPORT: CPT | Mod: ,,, | Performed by: INTERNAL MEDICINE

## 2025-02-10 PROCEDURE — 99285 EMERGENCY DEPT VISIT HI MDM: CPT | Mod: 25

## 2025-02-10 PROCEDURE — 81000 URINALYSIS NONAUTO W/SCOPE: CPT | Performed by: EMERGENCY MEDICINE

## 2025-02-10 PROCEDURE — 85025 COMPLETE CBC W/AUTO DIFF WBC: CPT | Performed by: EMERGENCY MEDICINE

## 2025-02-10 PROCEDURE — 93005 ELECTROCARDIOGRAM TRACING: CPT

## 2025-02-10 PROCEDURE — 80053 COMPREHEN METABOLIC PANEL: CPT | Performed by: EMERGENCY MEDICINE

## 2025-02-10 NOTE — TELEPHONE ENCOUNTER
----- Message from Lima sent at 2/10/2025 11:12 AM CST -----  Patient called said she returning a missed call from the office she can be reached at [phone]

## 2025-02-10 NOTE — TELEPHONE ENCOUNTER
----- Message from Summer sent at 2/10/2025  8:37 AM CST -----  Regarding: appt  Type:  Patient Returning Call        Name of who is calling:pt         What is request in detail:pt is requesting a call back in regards to appt, pt was recently in ER Saturday and seen dr larios and he told her to make a follow up appt to see him, pt would be a new pt please assist.        Can clinic reply by [facility]:no        What number to call back if not in [facility]:[phone]

## 2025-02-10 NOTE — TELEPHONE ENCOUNTER
Called pt in regards to appt, pt states she has an upcoming appt with Urology on 2/25, also pt is requesting a different pain medication due to her being told in hospital to stop taking the oxycodone. Pt is requesting tramadol. Pt states she has some tramadol left over from a surgery in 2018. Advised pt not to take exp medication. Pt also was concerned if she has enough cipro for her UTI; pt has currently be prescribed 60 tablets. Informed pt I will relay her concerns/request to provider. Pt verbalized understanding.

## 2025-02-11 PROBLEM — N30.90 CYSTITIS: Status: ACTIVE | Noted: 2025-02-11

## 2025-02-11 LAB
ALBUMIN SERPL BCP-MCNC: 4 G/DL (ref 3.5–5.2)
ALP SERPL-CCNC: 100 U/L (ref 40–150)
ALT SERPL W/O P-5'-P-CCNC: 35 U/L (ref 10–44)
ANION GAP SERPL CALC-SCNC: 12 MMOL/L (ref 8–16)
AST SERPL-CCNC: 17 U/L (ref 10–40)
BACTERIA #/AREA URNS HPF: ABNORMAL /HPF
BILIRUB SERPL-MCNC: 0.4 MG/DL (ref 0.1–1)
BILIRUB UR QL STRIP: NEGATIVE
BUN SERPL-MCNC: 14 MG/DL (ref 6–20)
CALCIUM SERPL-MCNC: 10.1 MG/DL (ref 8.7–10.5)
CHLORIDE SERPL-SCNC: 104 MMOL/L (ref 95–110)
CLARITY UR: CLEAR
CO2 SERPL-SCNC: 22 MMOL/L (ref 23–29)
COLOR UR: YELLOW
CREAT SERPL-MCNC: 0.8 MG/DL (ref 0.5–1.4)
CTP QC/QA: YES
CTP QC/QA: YES
EST. GFR  (NO RACE VARIABLE): >60 ML/MIN/1.73 M^2
GLUCOSE SERPL-MCNC: 101 MG/DL (ref 70–110)
GLUCOSE UR QL STRIP: NEGATIVE
HCV AB SERPL QL IA: NEGATIVE
HGB UR QL STRIP: NEGATIVE
HIV 1+2 AB+HIV1 P24 AG SERPL QL IA: NEGATIVE
KETONES UR QL STRIP: NEGATIVE
LDH SERPL L TO P-CCNC: 1.28 MMOL/L (ref 0.5–2.2)
LEUKOCYTE ESTERASE UR QL STRIP: NEGATIVE
MICROSCOPIC COMMENT: ABNORMAL
NITRITE UR QL STRIP: POSITIVE
OHS QRS DURATION: 74 MS
OHS QTC CALCULATION: 430 MS
PH UR STRIP: 8 [PH] (ref 5–8)
POC MOLECULAR INFLUENZA A AGN: NEGATIVE
POC MOLECULAR INFLUENZA B AGN: NEGATIVE
POTASSIUM SERPL-SCNC: 4.4 MMOL/L (ref 3.5–5.1)
PROT SERPL-MCNC: 7.7 G/DL (ref 6–8.4)
PROT UR QL STRIP: NEGATIVE
SAMPLE: NORMAL
SARS-COV-2 RDRP RESP QL NAA+PROBE: NEGATIVE
SODIUM SERPL-SCNC: 138 MMOL/L (ref 136–145)
SP GR UR STRIP: 1 (ref 1–1.03)
URN SPEC COLLECT METH UR: ABNORMAL
UROBILINOGEN UR STRIP-ACNC: NEGATIVE EU/DL
WBC #/AREA URNS HPF: 1 /HPF (ref 0–5)

## 2025-02-11 PROCEDURE — 96366 THER/PROPH/DIAG IV INF ADDON: CPT

## 2025-02-11 PROCEDURE — 96375 TX/PRO/DX INJ NEW DRUG ADDON: CPT

## 2025-02-11 PROCEDURE — 63600175 PHARM REV CODE 636 W HCPCS: Performed by: OBSTETRICS & GYNECOLOGY

## 2025-02-11 PROCEDURE — 25000003 PHARM REV CODE 250: Performed by: OBSTETRICS & GYNECOLOGY

## 2025-02-11 PROCEDURE — 63600175 PHARM REV CODE 636 W HCPCS

## 2025-02-11 PROCEDURE — G0378 HOSPITAL OBSERVATION PER HR: HCPCS

## 2025-02-11 PROCEDURE — 25000003 PHARM REV CODE 250

## 2025-02-11 PROCEDURE — 96365 THER/PROPH/DIAG IV INF INIT: CPT

## 2025-02-11 PROCEDURE — 87635 SARS-COV-2 COVID-19 AMP PRB: CPT | Performed by: EMERGENCY MEDICINE

## 2025-02-11 PROCEDURE — 99223 1ST HOSP IP/OBS HIGH 75: CPT | Mod: ,,, | Performed by: OBSTETRICS & GYNECOLOGY

## 2025-02-11 PROCEDURE — 87389 HIV-1 AG W/HIV-1&-2 AB AG IA: CPT | Performed by: EMERGENCY MEDICINE

## 2025-02-11 PROCEDURE — 87040 BLOOD CULTURE FOR BACTERIA: CPT | Performed by: EMERGENCY MEDICINE

## 2025-02-11 PROCEDURE — 25000003 PHARM REV CODE 250: Performed by: EMERGENCY MEDICINE

## 2025-02-11 PROCEDURE — 51702 INSERT TEMP BLADDER CATH: CPT

## 2025-02-11 PROCEDURE — 86803 HEPATITIS C AB TEST: CPT | Performed by: EMERGENCY MEDICINE

## 2025-02-11 PROCEDURE — 96376 TX/PRO/DX INJ SAME DRUG ADON: CPT

## 2025-02-11 RX ORDER — POLYETHYLENE GLYCOL 3350 17 G/17G
17 POWDER, FOR SOLUTION ORAL 2 TIMES DAILY
Status: DISCONTINUED | OUTPATIENT
Start: 2025-02-11 | End: 2025-02-12 | Stop reason: HOSPADM

## 2025-02-11 RX ORDER — ACETAMINOPHEN 500 MG
1000 TABLET ORAL EVERY 6 HOURS PRN
Status: CANCELLED | OUTPATIENT
Start: 2025-02-11

## 2025-02-11 RX ORDER — PHENAZOPYRIDINE HYDROCHLORIDE 100 MG/1
200 TABLET, FILM COATED ORAL 3 TIMES DAILY PRN
Status: DISCONTINUED | OUTPATIENT
Start: 2025-02-11 | End: 2025-02-12 | Stop reason: HOSPADM

## 2025-02-11 RX ORDER — IBUPROFEN 600 MG/1
600 TABLET ORAL EVERY 6 HOURS PRN
Status: DISCONTINUED | OUTPATIENT
Start: 2025-02-11 | End: 2025-02-11

## 2025-02-11 RX ORDER — METOPROLOL TARTRATE 50 MG/1
50 TABLET ORAL 2 TIMES DAILY
Status: DISCONTINUED | OUTPATIENT
Start: 2025-02-11 | End: 2025-02-11

## 2025-02-11 RX ORDER — PHENAZOPYRIDINE HYDROCHLORIDE 100 MG/1
200 TABLET, FILM COATED ORAL 3 TIMES DAILY PRN
Qty: 42 TABLET | Refills: 0 | Status: SHIPPED | OUTPATIENT
Start: 2025-02-11 | End: 2025-02-25

## 2025-02-11 RX ORDER — NITROFURANTOIN 25; 75 MG/1; MG/1
100 CAPSULE ORAL 2 TIMES DAILY
Qty: 10 CAPSULE | Refills: 0 | Status: SHIPPED | OUTPATIENT
Start: 2025-02-11 | End: 2025-02-11

## 2025-02-11 RX ORDER — LOSARTAN POTASSIUM 50 MG/1
100 TABLET ORAL DAILY
Status: DISCONTINUED | OUTPATIENT
Start: 2025-02-11 | End: 2025-02-12 | Stop reason: HOSPADM

## 2025-02-11 RX ORDER — NITROFURANTOIN 25; 75 MG/1; MG/1
100 CAPSULE ORAL EVERY 12 HOURS
Status: DISCONTINUED | OUTPATIENT
Start: 2025-02-12 | End: 2025-02-12 | Stop reason: HOSPADM

## 2025-02-11 RX ORDER — PROCHLORPERAZINE EDISYLATE 5 MG/ML
5 INJECTION INTRAMUSCULAR; INTRAVENOUS EVERY 6 HOURS PRN
Status: DISCONTINUED | OUTPATIENT
Start: 2025-02-11 | End: 2025-02-12 | Stop reason: HOSPADM

## 2025-02-11 RX ORDER — SODIUM CHLORIDE 0.9 % (FLUSH) 0.9 %
10 SYRINGE (ML) INJECTION
Status: DISCONTINUED | OUTPATIENT
Start: 2025-02-11 | End: 2025-02-12 | Stop reason: HOSPADM

## 2025-02-11 RX ORDER — HYDRALAZINE HYDROCHLORIDE 20 MG/ML
10 INJECTION INTRAMUSCULAR; INTRAVENOUS EVERY 6 HOURS PRN
Status: DISCONTINUED | OUTPATIENT
Start: 2025-02-11 | End: 2025-02-12 | Stop reason: HOSPADM

## 2025-02-11 RX ORDER — OXYCODONE HYDROCHLORIDE 5 MG/1
5 TABLET ORAL EVERY 4 HOURS PRN
Status: DISCONTINUED | OUTPATIENT
Start: 2025-02-11 | End: 2025-02-12 | Stop reason: HOSPADM

## 2025-02-11 RX ORDER — VANCOMYCIN 1.75 GRAM/500 ML IN 0.9 % SODIUM CHLORIDE INTRAVENOUS
1750 ONCE
Status: COMPLETED | OUTPATIENT
Start: 2025-02-11 | End: 2025-02-11

## 2025-02-11 RX ORDER — ACETAMINOPHEN 325 MG/1
650 TABLET ORAL EVERY 6 HOURS PRN
Status: DISCONTINUED | OUTPATIENT
Start: 2025-02-11 | End: 2025-02-11

## 2025-02-11 RX ORDER — VANCOMYCIN 2 GRAM/500 ML IN 0.9 % SODIUM CHLORIDE INTRAVENOUS
2000
Status: DISCONTINUED | OUTPATIENT
Start: 2025-02-11 | End: 2025-02-11

## 2025-02-11 RX ORDER — NITROFURANTOIN 25; 75 MG/1; MG/1
100 CAPSULE ORAL 2 TIMES DAILY
Qty: 14 CAPSULE | Refills: 0 | Status: SHIPPED | OUTPATIENT
Start: 2025-02-11 | End: 2025-02-18

## 2025-02-11 RX ORDER — HYDROMORPHONE HYDROCHLORIDE 1 MG/ML
0.2 INJECTION, SOLUTION INTRAMUSCULAR; INTRAVENOUS; SUBCUTANEOUS
Status: CANCELLED | OUTPATIENT
Start: 2025-02-11

## 2025-02-11 RX ORDER — DIPHENHYDRAMINE HCL 25 MG
25 CAPSULE ORAL EVERY 6 HOURS PRN
Status: DISCONTINUED | OUTPATIENT
Start: 2025-02-11 | End: 2025-02-12 | Stop reason: HOSPADM

## 2025-02-11 RX ORDER — OXYCODONE HYDROCHLORIDE 5 MG/1
5 TABLET ORAL EVERY 4 HOURS PRN
Status: CANCELLED | OUTPATIENT
Start: 2025-02-11

## 2025-02-11 RX ORDER — PHENAZOPYRIDINE HYDROCHLORIDE 100 MG/1
100 TABLET, FILM COATED ORAL 3 TIMES DAILY PRN
Qty: 42 TABLET | Refills: 0 | Status: SHIPPED | OUTPATIENT
Start: 2025-02-11 | End: 2025-02-11

## 2025-02-11 RX ORDER — ACETAMINOPHEN 325 MG/1
650 TABLET ORAL
Status: DISCONTINUED | OUTPATIENT
Start: 2025-02-11 | End: 2025-02-12 | Stop reason: HOSPADM

## 2025-02-11 RX ORDER — NEBIVOLOL 10 MG/1
10 TABLET ORAL 2 TIMES DAILY
Status: DISCONTINUED | OUTPATIENT
Start: 2025-02-11 | End: 2025-02-12 | Stop reason: HOSPADM

## 2025-02-11 RX ORDER — ONDANSETRON 8 MG/1
8 TABLET, ORALLY DISINTEGRATING ORAL EVERY 8 HOURS PRN
Status: DISCONTINUED | OUTPATIENT
Start: 2025-02-11 | End: 2025-02-12 | Stop reason: HOSPADM

## 2025-02-11 RX ORDER — PANTOPRAZOLE SODIUM 40 MG/10ML
40 INJECTION, POWDER, LYOPHILIZED, FOR SOLUTION INTRAVENOUS 2 TIMES DAILY
Status: DISCONTINUED | OUTPATIENT
Start: 2025-02-11 | End: 2025-02-12 | Stop reason: HOSPADM

## 2025-02-11 RX ORDER — AMOXICILLIN 250 MG
1 CAPSULE ORAL 2 TIMES DAILY
Status: DISCONTINUED | OUTPATIENT
Start: 2025-02-11 | End: 2025-02-12 | Stop reason: HOSPADM

## 2025-02-11 RX ORDER — ONDANSETRON HYDROCHLORIDE 2 MG/ML
4 INJECTION, SOLUTION INTRAVENOUS EVERY 4 HOURS PRN
Status: CANCELLED | OUTPATIENT
Start: 2025-02-11

## 2025-02-11 RX ORDER — HYDROCODONE BITARTRATE AND ACETAMINOPHEN 5; 325 MG/1; MG/1
1 TABLET ORAL
Status: COMPLETED | OUTPATIENT
Start: 2025-02-11 | End: 2025-02-11

## 2025-02-11 RX ORDER — LEVOTHYROXINE SODIUM 88 UG/1
88 TABLET ORAL
Status: DISCONTINUED | OUTPATIENT
Start: 2025-02-11 | End: 2025-02-12 | Stop reason: HOSPADM

## 2025-02-11 RX ADMIN — LOSARTAN POTASSIUM 100 MG: 50 TABLET, FILM COATED ORAL at 11:02

## 2025-02-11 RX ADMIN — ACETAMINOPHEN 650 MG: 325 TABLET, FILM COATED ORAL at 04:02

## 2025-02-11 RX ADMIN — ACETAMINOPHEN 650 MG: 325 TABLET, FILM COATED ORAL at 09:02

## 2025-02-11 RX ADMIN — VANCOMYCIN HYDROCHLORIDE 1750 MG: 500 INJECTION, POWDER, LYOPHILIZED, FOR SOLUTION INTRAVENOUS at 05:02

## 2025-02-11 RX ADMIN — LEVOTHYROXINE SODIUM 88 MCG: 88 TABLET ORAL at 08:02

## 2025-02-11 RX ADMIN — NEBIVOLOL HYDROCHLORIDE 10 MG: 10 TABLET ORAL at 09:02

## 2025-02-11 RX ADMIN — POLYETHYLENE GLYCOL 3350 17 G: 17 POWDER, FOR SOLUTION ORAL at 08:02

## 2025-02-11 RX ADMIN — HYDROCODONE BITARTRATE AND ACETAMINOPHEN 1 TABLET: 5; 325 TABLET ORAL at 03:02

## 2025-02-11 RX ADMIN — POLYETHYLENE GLYCOL 3350 17 G: 17 POWDER, FOR SOLUTION ORAL at 09:02

## 2025-02-11 RX ADMIN — VANCOMYCIN HYDROCHLORIDE 1000 MG: 1 INJECTION, POWDER, LYOPHILIZED, FOR SOLUTION INTRAVENOUS at 04:02

## 2025-02-11 RX ADMIN — SENNOSIDES AND DOCUSATE SODIUM 1 TABLET: 8.6; 5 TABLET ORAL at 09:02

## 2025-02-11 RX ADMIN — OXYCODONE HYDROCHLORIDE 5 MG: 5 TABLET ORAL at 04:02

## 2025-02-11 RX ADMIN — PHENAZOPYRIDINE 200 MG: 200 TABLET ORAL at 05:02

## 2025-02-11 RX ADMIN — PHENAZOPYRIDINE 200 MG: 200 TABLET ORAL at 08:02

## 2025-02-11 RX ADMIN — PANTOPRAZOLE SODIUM 40 MG: 40 INJECTION, POWDER, FOR SOLUTION INTRAVENOUS at 09:02

## 2025-02-11 RX ADMIN — PANTOPRAZOLE SODIUM 40 MG: 40 INJECTION, POWDER, FOR SOLUTION INTRAVENOUS at 08:02

## 2025-02-11 RX ADMIN — DIPHENHYDRAMINE HYDROCHLORIDE 25 MG: 25 CAPSULE ORAL at 04:02

## 2025-02-11 RX ADMIN — OXYCODONE HYDROCHLORIDE 5 MG: 5 TABLET ORAL at 08:02

## 2025-02-11 NOTE — TELEPHONE ENCOUNTER
calling stating  on 2/3 patient had a hysterectomy and had fibroids removed and they nicked her bladder. She was discharged yesterday. Today she has had increased pain in her abdomen and temp is now 100.2, oral. She feels really weak and tired. She does have a uti and on antibiotics, cipro she thinks she started on Friday. She says she's on the verge of being nauseated. She still has a catheter and it has been filling up but she does have dry mouth. Spoke to on call provider, Dr. Salmeron. She says patient needs to go to ED to be evaluated. If she does not want to go she should contact her gyn or urologist in the morning. Patient advised and they will go to ED now. Please contact caller directly to discuss any further care advice.    Reason for Disposition   Patient sounds very sick or weak to the triager    Additional Information   Negative: [1] Widespread rash AND [2] bright red, sunburn-like   Negative: [1] SEVERE headache AND [2] after spinal (epidural) anesthesia   Negative: [1] Vomiting AND [2] persists > 4 hours   Negative: [1] Vomiting AND [2] abdomen looks much more swollen than usual   Negative: [1] Drinking very little AND [2] dehydration suspected (e.g., no urine > 12 hours, very dry mouth, very lightheaded)    Protocols used: Post-Op Symptoms and Vllozkrwf-G-KM     (2) well flexed

## 2025-02-11 NOTE — ASSESSMENT & PLAN NOTE
Shara Cordoba is a 44 year old women that had a suspected bladder injury during vaginal hysterectomy. She is currently POD#8    - Maintain leon catheter   - Recommend Macrobid based on 2/6/2025 culture   - Outpatient Urology follow up scheduled for 2/25/2025  - Recommend Ditropan and Pyridium for symptom relief. On note urine will appear to be nitrite positive when taking Pyridium.   - Okay for discharge from Urologic perspective   - Rest of care per primary team

## 2025-02-11 NOTE — ED NOTES
Pt assisted to private bathroom to have a BM. Pt requesting to have BM before her catheter is changed out. MD sparrow.

## 2025-02-11 NOTE — ED PROVIDER NOTES
Encounter Date: 2/10/2025       History     Chief Complaint   Patient presents with    Fever     C/o fever of 100.9, had hysterectomy on 2/5/25 w/ complications     44-year-old female with history of hypothyroidism and hypertension status post vaginal hysterectomy on 2/3 by Dr. Haji with bladder injury status post cystoureteroscopy and ureteral stent placement on 02/08 by Dr. Adorno presents with complaint of fever T-max 100.9 at home today with chills and decreased energy.  The patient was discharged yesterday on Cipro for a UTI.  She believes she started taking Cipro 3 days ago and prior to that she was on Macrobid.  She is currently complaining of lower abdominal and low back discomfort, but she does state that she has only taken 2 doses of OxyContin since discharge (1 for the right home and 1 for the right back to the emergency department).  She denies any other complaints at this time.      Review of patient's allergies indicates:   Allergen Reactions    Amlodipine Edema, Other (See Comments) and Swelling    Meloxicam Edema, Other (See Comments), Palpitations and Swelling    Bactrim [sulfamethoxazole-trimethoprim] Other (See Comments)     Persistant yeast infection     Pcn [penicillins]      Unsure of reaction, was a child    Prednisone      High blood pressure      Past Medical History:   Diagnosis Date    GERD (gastroesophageal reflux disease)     Hypertension     Hypothyroidism     Peptic ulcer     Wears contact lenses      Past Surgical History:   Procedure Laterality Date    COLONOSCOPY      CYSTOSCOPY      CYSTOURETEROSCOPY WITH RETROGRADE PYELOGRAPHY AND INSERTION OF STENT INTO URETER Bilateral 2/8/2025    Procedure: CYSTOURETEROSCOPY, WITH RETROGRADE PYELOGRAM AND URETERAL STENT INSERTION;  Surgeon: Fransisco Adorno MD;  Location[facility];  Service: Urology;  Laterality: Bilateral;    ESOPHAGOGASTRODUODENOSCOPY      HIP SURGERY Left     HYSTERECTOMY, VAGINAL, LAPAROSCOPY-ASSISTED, WITH SALPINGECTOMY  Bilateral 2/3/2025    Procedure: HYSTERECTOMY,VAGINAL,LAPAROSCOPY-ASSISTED,WITH SALPINGECTOMY;  Surgeon: Klaus Diaz MD;  Location: [facility] OR;  Service: OB/GYN;  Laterality: Bilateral;    HYSTEROSCOPIC POLYPECTOMY OF UTERUS N/A 12/1/2023    Procedure: POLYPECTOMY, UTERUS, HYSTEROSCOPIC;  Surgeon: Marita Dupnot MD;  Location: [facility] OR;  Service: OB/GYN;  Laterality: N/A;    HYSTEROSCOPY WITH DILATION AND CURETTAGE OF UTERUS N/A 12/1/2023    Procedure: HYSTEROSCOPY, WITH DILATION AND CURETTAGE OF UTERUS;  Surgeon: Marita Dupont MD;  Location: [facility] OR;  Service: OB/GYN;  Laterality: N/A;  w/TrueClear  2nd case per Moraima    PARATHYROIDECTOMY      THERMAL ABLATION OF ENDOMETRIUM USING HYSTEROSCOPY N/A 12/1/2023    Procedure: ABLATION, ENDOMETRIUM, THERMAL, HYSTEROSCOPIC;  Surgeon: Marita Dupont MD;  Location: [facility] OR;  Service: OB/GYN;  Laterality: N/A;     Family History   Problem Relation Name Age of Onset    No Known Problems Mother      No Known Problems Father      Breast cancer Maternal Grandmother       Social History     Tobacco Use    Smoking status: Never    Smokeless tobacco: Never   Substance Use Topics    Alcohol use: Yes     Comment: occ.    Drug use: No     Review of Systems    Physical Exam     Initial Vitals [02/10/25 2315]   BP Pulse Resp Temp SpO2   132/75 76 15 97.8 °F (36.6 °C) 96 %      MAP       --         Physical Exam    Vitals reviewed.  Constitutional: She appears well-developed and well-nourished. She is not diaphoretic. No distress.   HENT:   Head: Normocephalic and atraumatic.   Right Ear: External ear normal.   Left Ear: External ear normal.   Eyes: Conjunctivae and EOM are normal. Right eye exhibits no discharge. Left eye exhibits no discharge.   Neck:   Normal range of motion.  Cardiovascular:  Normal rate, regular rhythm and normal heart sounds.     Exam reveals no gallop and no friction rub.       No murmur heard.  Pulmonary/Chest: Breath sounds normal. No  respiratory distress. She has no wheezes. She has no rhonchi. She has no rales.   Abdominal: Abdomen is soft. She exhibits no distension. There is no abdominal tenderness. There is no rebound and no guarding.   Genitourinary:    Genitourinary Comments: Fofana catheter in place with yellow orange colored urine     Musculoskeletal:         General: Normal range of motion.      Cervical back: Normal range of motion.     Neurological: She is alert and oriented to person, place, and time.   Skin: Skin is warm.         ED Course   Procedures  Labs Reviewed   CBC W/ AUTO DIFFERENTIAL - Abnormal       Result Value    WBC 12.39      RBC 3.65 (*)     Hemoglobin 11.6 (*)     Hematocrit 34.7 (*)     MCV 95      MCH 31.8 (*)     MCHC 33.4      RDW 11.9      Platelets 377      MPV 9.0 (*)     Immature Granulocytes 0.5      Gran # (ANC) 10.6 (*)     Immature Grans (Abs) 0.06 (*)     Lymph # 0.9 (*)     Mono # 0.7      Eos # 0.1      Baso # 0.05      nRBC 0      Gran % 85.5 (*)     Lymph % 7.6 (*)     Mono % 5.3      Eosinophil % 0.7      Basophil % 0.4      Differential Method Automated     COMPREHENSIVE METABOLIC PANEL - Abnormal    Sodium 138      Potassium 4.4      Chloride 104      CO2 22 (*)     Glucose 101      BUN 14      Creatinine 0.8      Calcium 10.1      Total Protein 7.7      Albumin 4.0      Total Bilirubin 0.4      Alkaline Phosphatase 100      AST 17      ALT 35      eGFR >60      Anion Gap 12     URINALYSIS, REFLEX TO URINE CULTURE - Abnormal    Specimen UA Urine, Catheterized      Color, UA Yellow      Appearance, UA Clear      pH, UA 8.0      Specific Gravity, UA 1.005      Protein, UA Negative      Glucose, UA Negative      Ketones, UA Negative      Bilirubin (UA) Negative      Occult Blood UA Negative      Nitrite, UA Positive (*)     Urobilinogen, UA Negative      Leukocytes, UA Negative      Narrative:     Specimen Source->Urine   URINALYSIS MICROSCOPIC - Abnormal    WBC, UA 1      Bacteria Few (*)      Microscopic Comment SEE COMMENT      Narrative:     Specimen Source->Urine   CULTURE, BLOOD   CULTURE, BLOOD   HEPATITIS C ANTIBODY    Hepatitis C Ab Negative      Narrative:     Release to patient->Immediate   HIV 1 / 2 ANTIBODY    HIV 1/2 Ag/Ab Negative      Narrative:     Release to patient->Immediate   LACTIC ACID, PLASMA   SARS-COV-2 RDRP GENE    POC Rapid COVID Negative       Acceptable Yes     POCT INFLUENZA A/B MOLECULAR    POC Molecular Influenza A Ag Negative      POC Molecular Influenza B Ag Negative       Acceptable Yes     ISTAT LACTATE    POC Lactate 1.28      Sample VENOUS            Imaging Results    None          Medications   vancomycin 2 g in 0.9% sodium chloride 500 mL IVPB (has no administration in time range)     Medical Decision Making  44-year-old female status post RALPH/BSO with bladder injury currently being treated for UTI with Cipro presents with fever at home since being discharged yesterday.  She is afebrile, hemodynamically stable and well-appearing.  Sepsis workup initiated to evaluate for other potential source of fever.    Workup thus far unremarkable.  COVID and flu were both negative.  CBC without leukocytosis and lactic acid negative.  She is nitrite positive on her UA.  Urine culture from 5 days ago was sensitive to vancomycin, Macrobid, and ampicillin.  The case has been discussed with Dr. Salmeron, urology on-call, who agrees with vancomycin based on recent urine culture.  Will admit to the gyn service for IV antibiotics.    Amount and/or Complexity of Data Reviewed  Labs: ordered.    Risk  Prescription drug management.                                      Clinical Impression:  Final diagnoses:  [Z13.6] Screening for cardiovascular condition  [T83.511S, N39.0] Urinary tract infection associated with indwelling urethral catheter, sequela (Primary)          ED Disposition Condition    Observation Stable                Chiquis Camargo MD  02/11/25  [id number]

## 2025-02-11 NOTE — CONSULTS
[facility] Emergency Dept  Obstetrics & Gynecology  Consult Note    Patient Name: Shara Cordoba  MRN: [id number]  Admission Date: 2/10/2025  Hospital Length of Stay: 0 days  Code Status: Full Code  Primary Care Provider: Gisela Giordano MD  Principal Problem: <principal problem not specified>    Inpatient consult to Gynecology  Consult performed by: Barrera Morales MD  Consult ordered by: Barrera Morales MD        Subjective:     Chief Complaint: Fever, Abdominal Pain    History of Present Illness: Ms. Shara Cordoba is a 44 y.o. [id number] who presents POD#8 s/p VNOTES/BS with fever and abdominal pain and is being re-admitted for management of UTI/postoperative bladder injury. Please see H&P for full details.    Barrera Morales MD PGY2  Obstetrics and Gynecology

## 2025-02-11 NOTE — PROGRESS NOTES
Pharmacokinetic Initial Assessment: IV Vancomycin    Assessment/Plan:    Initiate intravenous vancomycin with loading dose of 1750 mg once followed by a maintenance dose of vancomycin 1000mg IV every 12 hours  Desired empiric serum trough concentration is 10 to 15 mcg/mL  Draw vancomycin trough level 60 min prior to fourth dose on 2/12 at approximately 1700  Pharmacy will continue to follow and monitor vancomycin.      Please contact pharmacy at extension [phone] with any questions regarding this assessment.     Thank you for the consult,   Ursula Vance       Patient brief summary:  Shara Cordoba is a 44 y.o. female initiated on antimicrobial therapy with IV Vancomycin for treatment of suspected urinary tract infection    Drug Allergies:   Review of patient's allergies indicates:   Allergen Reactions    Amlodipine Edema, Other (See Comments) and Swelling    Meloxicam Edema, Other (See Comments), Palpitations and Swelling    Bactrim [sulfamethoxazole-trimethoprim] Other (See Comments)     Persistant yeast infection     Pcn [penicillins]      Unsure of reaction, was a child    Prednisone      High blood pressure        Actual Body Weight:   74.8 kg    Renal Function:   Estimated Creatinine Clearance: 86.8 mL/min (based on SCr of 0.8 mg/dL).,     Dialysis Method (if applicable):  N/A    CBC (last 72 hours):  Recent Labs   Lab Result Units 02/10/25  2351   WBC K/uL 12.39   Hemoglobin g/dL 11.6*   Hematocrit % 34.7*   Platelets K/uL 377   Gran % % 85.5*   Lymph % % 7.6*   Mono % % 5.3   Eosinophil % % 0.7   Basophil % % 0.4   Differential Method  Automated       Metabolic Panel (last 72 hours):  Recent Labs   Lab Result Units 02/10/25  2351 02/10/25  2356   Sodium mmol/L 138  --    Potassium mmol/L 4.4  --    Chloride mmol/L 104  --    CO2 mmol/L 22*  --    Glucose mg/dL 101  --    Glucose, UA   --  Negative   BUN mg/dL 14  --    Creatinine mg/dL 0.8  --    Albumin g/dL 4.0  --    Total Bilirubin mg/dL 0.4  --   "  Alkaline Phosphatase U/L 100  --    AST U/L 17  --    ALT U/L 35  --        Drug levels (last 3 results):  No results for input(s): "VANCOMYCINRA", "VANCORANDOM", "VANCOMYCINPE", "VANCOPEAK", "VANCOMYCINTR", "VANCOTROUGH" in the last 72 hours.    Microbiologic Results:  Microbiology Results (last 7 days)       Procedure Component Value Units Date/Time    Blood culture x two cultures. Draw prior to antibiotics. [[id number]] Collected: 02/10/25 2351    Order Status: Sent Specimen: Blood from Peripheral, Antecubital, Right Updated: 02/11/25 0155    Blood culture x two cultures. Draw prior to antibiotics. [[id number]] Collected: 02/11/25 0051    Order Status: Sent Specimen: Blood             "

## 2025-02-11 NOTE — PLAN OF CARE
02/11/25 0834   Readmission   Was this a planned readmission? No   Why were you readmitted? Related to previous admission   When you left the hospital where did you go? Home with Family   Did patient/caregiver refused recommended DC plan? No   Did you try to manage your symptoms your self? Yes   Did you call anyone? Yes   Did you try to see or did see a doctor or nurse before you came? Yes   Were you seen? Yes   Did you have  a follow-up appointment on discharge? Yes   Did you go? No

## 2025-02-11 NOTE — H&P
[facility] - Emergency Dept  Obstetrics & Gynecology  History & Physical    Patient Name: Shara Cordoba  MRN: [id number]  Admission Date: 2/10/2025  Primary Care Provider: Gisela Giordano MD    Subjective:     Chief Complaint/Reason for Admission: Fever, Abdominal Pain    History of Present Illness:   Ms. Shara Cordoba is a 44 y.o. [id number] who presents POD#8 s/p VNOTES/BS with fever and abdominal  pain.     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 She was subsequently transferred to [facility] on 2025 (POD#4 VNOTES/BS with Dr Diaz) for evaluation by urology. Urology performed cystoureteroscopy with retrograde pyelogram and no bladder leakage or fistula were identified. Urology recommended conservative management with Fofana catheter for 7-14 days and she was discharged home in stable condition.     Today, patient presents with reported fever of 100.9, chills, nausea and abdominal pain that began over the past day. She states the abdominal pain is diffuse and sharp in nature, radiating to bilateral flanks. She denies any abnormal vaginal discharge. She reports good urine output with orange-tinged fluid (likely due to pyridium). She has been taking Ciprofloxacin as prescribed.     In ED, VSS - afebrile. Workup was negative for leukocytosis. Cr 0.8. Lactate WNL. UA +nitrites.     Current Facility-Administered Medications on File Prior to Encounter   Medication    [DISCONTINUED] acetaminophen tablet 1,000 mg    [DISCONTINUED] ciprofloxacin HCl tablet 750 mg    [DISCONTINUED] fluconazole tablet 200 mg    [DISCONTINUED] phenazopyridine tablet 200 mg    [DISCONTINUED] senna-docusate 8.6-50 mg per tablet 1 tablet    [DISCONTINUED] simethicone chewable tablet 160 mg    [DISCONTINUED] sodium phosphates 19-7 gram/118 mL enema 1 enema    [DISCONTINUED] traMADoL tablet  100 mg    [DISCONTINUED] traMADoL tablet 50 mg     Current Outpatient Medications on File Prior to Encounter   Medication Sig    acetaminophen (TYLENOL) 650 MG TbSR Take 1 tablet (650 mg total) by mouth every 8 (eight) hours.    biotin 10,000 mcg Cap Take 1 capsule by mouth Daily.    cholecalciferol, vitamin D3, (VITAMIN D3) 50 mcg (2,000 unit) Tab Take 1 tablet by mouth once daily.    ciprofloxacin HCl (CIPRO) 750 MG tablet Take 1 tablet (750 mg total) by mouth every 12 (twelve) hours.    docusate sodium (COLACE) 100 MG capsule Take 1 capsule (100 mg total) by mouth 2 (two) times daily. (Patient taking differently: Take 300 mg by mouth once daily.)    hydroCHLOROthiazide (HYDRODIURIL) 25 MG tablet Take 25 mg by mouth once daily.    Lactobacillus acidophilus (PROBIOTIC ORAL) Take 1 capsule by mouth once daily. Raw probiotic vaginal care  50 billion cfu    levothyroxine (SYNTHROID) 88 MCG tablet Take 88 mcg by mouth before breakfast.    losartan (COZAAR) 100 MG tablet Take 100 mg by mouth once daily.    magnesium gluconate 27.5 mg magne- sium (500 mg) Tab Take 120 mg by mouth nightly. For sleep    nebivoloL (BYSTOLIC) 10 MG Tab Take 10 mg by mouth 2 (two) times a day.    pantoprazole (PROTONIX) 40 MG tablet Take 40 mg by mouth once daily.    phenazopyridine (PYRIDIUM) 100 MG tablet Take 2 tablets (200 mg total) by mouth 3 (three) times daily as needed for Pain.    phenazopyridine (PYRIDIUM) 200 MG tablet Take 1 tablet (200 mg total) by mouth 3 (three) times daily with meals. for 10 days    polyethylene glycol (GLYCOLAX) 17 gram PwPk Take 17 g by mouth 2 (two) times daily. for 14 days    traMADoL (ULTRAM) 50 mg tablet Take 1 tablet (50 mg total) by mouth every 4 (four) hours as needed for Pain.       Review of patient's allergies indicates:   Allergen Reactions    Amlodipine Edema, Other (See Comments) and Swelling    Meloxicam Edema, Other (See Comments), Palpitations and Swelling    Bactrim  [sulfamethoxazole-trimethoprim] Other (See Comments)     Persistant yeast infection     Pcn [penicillins]      Unsure of reaction, was a child    Prednisone      High blood pressure        Past Medical History:   Diagnosis Date    GERD (gastroesophageal reflux disease)     Hypertension     Hypothyroidism     Peptic ulcer     Wears contact lenses      OB History    Para Term  AB Living   0 0 0 0 0 0   SAB IAB Ectopic Multiple Live Births   0 0 0 0 0     Past Surgical History:   Procedure Laterality Date    COLONOSCOPY      CYSTOSCOPY      CYSTOURETEROSCOPY WITH RETROGRADE PYELOGRAPHY AND INSERTION OF STENT INTO URETER Bilateral 2025    Procedure: CYSTOURETEROSCOPY, WITH RETROGRADE PYELOGRAM AND URETERAL STENT INSERTION;  Surgeon: Fransisco Adorno MD;  Location: [facility];  Service: Urology;  Laterality: Bilateral;    ESOPHAGOGASTRODUODENOSCOPY      HIP SURGERY Left     HYSTERECTOMY, VAGINAL, LAPAROSCOPY-ASSISTED, WITH SALPINGECTOMY Bilateral 2/3/2025    Procedure: HYSTERECTOMY,VAGINAL,LAPAROSCOPY-ASSISTED,WITH SALPINGECTOMY;  Surgeon: Klaus Diaz MD;  Location: [facility] OR;  Service: OB/GYN;  Laterality: Bilateral;    HYSTEROSCOPIC POLYPECTOMY OF UTERUS N/A 2023    Procedure: POLYPECTOMY, UTERUS, HYSTEROSCOPIC;  Surgeon: Marita Dupont MD;  Location: [facility] OR;  Service: OB/GYN;  Laterality: N/A;    HYSTEROSCOPY WITH DILATION AND CURETTAGE OF UTERUS N/A 2023    Procedure: HYSTEROSCOPY, WITH DILATION AND CURETTAGE OF UTERUS;  Surgeon: Marita Dupont MD;  Location: [facility] OR;  Service: OB/GYN;  Laterality: N/A;  w/TrueClear  2nd case per Moraima    PARATHYROIDECTOMY      THERMAL ABLATION OF ENDOMETRIUM USING HYSTEROSCOPY N/A 2023    Procedure: ABLATION, ENDOMETRIUM, THERMAL, HYSTEROSCOPIC;  Surgeon: Marita Dupont MD;  Location: [facility] OR;  Service: OB/GYN;  Laterality: N/A;     Family History       Problem Relation (Age of Onset)    Breast cancer Maternal Grandmother    No  Known Problems Mother, Father          Tobacco Use    Smoking status: Never    Smokeless tobacco: Never   Substance and Sexual Activity    Alcohol use: Yes     Comment: occ.    Drug use: No    Sexual activity: Yes     Partners: Male     Birth control/protection: Partner-Vasectomy     Review of Systems   Constitutional:  Positive for fever. Negative for chills.   Respiratory:  Negative for shortness of breath.    Cardiovascular:  Negative for chest pain.   Gastrointestinal:  Positive for abdominal pain and nausea. Negative for diarrhea and vomiting.   Genitourinary:  Negative for vaginal bleeding and vaginal discharge.   Musculoskeletal:  Positive for back pain.   Neurological:  Negative for headaches.   Psychiatric/Behavioral:  Negative for depression. The patient is not nervous/anxious.      Objective:     Vital Signs (Most Recent):  Temp: 98.2 °F (36.8 °C) (02/11/25 0123)  Pulse: 72 (02/11/25 0400)  Resp: 17 (02/11/25 0405)  BP: 118/75 (02/11/25 0400)  SpO2: 96 % (02/11/25 0400) Vital Signs (24h Range):  Temp:  [97.8 °F (36.6 °C)-98.2 °F (36.8 °C)] 98.2 °F (36.8 °C)  Pulse:  [63-76] 72  Resp:  [14-22] 17  SpO2:  [95 %-97 %] 96 %  BP: (107-132)/(68-75) 118/75     Weight: 74.8 kg (165 lb)  Body mass index is 30.18 kg/m².  Patient's last menstrual period was 01/28/2025.    Physical Exam:   Constitutional: She appears well-developed and well-nourished. No distress.    HENT:   Head: Normocephalic and atraumatic.    Eyes: Conjunctivae are normal.      Pulmonary/Chest: Effort normal. No respiratory distress.        Abdominal: Soft. She exhibits no distension and no abdominal incision. There is abdominal tenderness (suprapubic tenderness).                  Skin: Skin is warm and dry.    Psychiatric: She has a normal mood and affect. Her behavior is normal. Thought content normal.     Laboratory:  CBC:   Recent Labs   Lab 02/10/25  2351   WBC 12.39   RBC 3.65*   HGB 11.6*   HCT 34.7*      MCV 95   MCH 31.8*   MCHC  33.4     CMP:   Recent Labs   Lab 02/10/25  2351      CALCIUM 10.1   ALBUMIN 4.0   PROT 7.7      K 4.4   CO2 22*      BUN 14   CREATININE 0.8   ALKPHOS 100   ALT 35   AST 17   BILITOT 0.4       Assessment/Plan:     Active Diagnoses:    Diagnosis Date Noted POA    Bladder injury [S37.20XA] 02/06/2025 Yes    Hypertension [I10] 02/06/2025 Yes     Chronic    S/P VNOTES (vaginal hysterectomy, R salpingectomy, L fimbria removal) [Z90.710] 02/03/2025 Yes      Problems Resolved During this Admission:       Postoperative bladder injury  - VSS, afebrile. No evidence of leukocytosis.  - UA +nitrites  - Blood culture in process, Urine culture ordered  - Second readmission secondary to postoperative bladder injury  - ED physician discussed with urology on call who recommend to initiate broad-spectrum antibiotics with known susceptibility to previous urine culture collected on 02/06  - Patient has Hx allergy to PCN/Bactrim   - Vancomycin ordered  - RN notified to replace current leon catheter   - Formal urology consult placed for AM. Appreciate their care.      Hypertension  - Home meds: HCTZ, Losartan, Nebivolol  - Holding home HCTZ, Losartan   - Nebivolol not available > will order metoprolol alternative   - Hydral PRN for SBP >180     Constipation  - Scheduled Colace, Miralax BID, Simethicone PRN  - Suppository/enema PRN     Hypothyroidism  - Continue home synthroid     History of PUD/GERD  - Avoid NSAIDs  - Continue home protonix     Routine  - Pain: Tylenol, Oxy 5/10  - Diet/IVF:  Reg   - VTE Ppx: SCDs  - GI Ppx: Protonix  - Consults: Urology in AM      Barrera Morales MD  Obstetrics & Gynecology  [facility] - Emergency Dept

## 2025-02-11 NOTE — PLAN OF CARE
Case Management Assessment      PCP: Gisela Giordano MD  [facility]     Patient Arrived From: Home  Existing Help at Home: Spouse     Barriers to Discharge: None     Discharge Plan:               A. Home              B. Home with family     [facility] - Emergency Dept  Initial Discharge Assessment       Primary Care Provider: Gisela Giordano MD    Admission Diagnosis: Urinary tract infection associated with indwelling urethral catheter, sequela [T83.511S, N39.0]    Admission Date: 2/10/2025  Expected Discharge Date:     Transition of Care Barriers: (P) None    Payor: [employer] / Plan: [employer] GENERIC / Product Type: Commercial /     Extended Emergency Contact Information  Primary Emergency Contact: AdalidKvng  Mobile Phone: [phone]  Relation: Spouse    Discharge Plan A: (P) Home, Home with family  Discharge Plan B: (P) Home      [id number] [place], [place] - [address]  [address] [address]  Phone: [phone] Fax: [phone]    [facility] - [place] - [address]  [address]  [place] [address]  Phone: [phone] Fax: [phone]    [facility]  [address]  [place] [address]  Phone: [phone] Fax: [phone]      Initial Assessment (most recent)       Adult Discharge Assessment - 02/11/25 0832          Discharge Assessment    Assessment Type Discharge Planning Assessment (P)      Confirmed/corrected address, phone number and insurance Yes (P)      Confirmed Demographics Correct on Facesheet (P)      Source of Information health record (P)      People in Home spouse (P)      Do you expect to return to your current living situation? Yes (P)      Do you have help at home or someone to help you manage your care at home? Yes (P)      Prior to hospitilization cognitive status: Alert/Oriented;No Deficits (P)      Current cognitive status: Alert/Oriented;No Deficits (P)      Equipment  Currently Used at Home none (P)      Readmission within 30 days? Yes (P)      Patient currently being followed by outpatient case management? No (P)      Do you currently have service(s) that help you manage your care at home? No (P)      Do you take prescription medications? Yes (P)      Do you have prescription coverage? Yes (P)      Do you have any problems affording any of your prescribed medications? No (P)      Is the patient taking medications as prescribed? yes (P)      How do you get to doctors appointments? family or friend will provide (P)      Are you on dialysis? No (P)      Do you take coumadin? No (P)      Discharge Plan A Home;Home with family (P)      Discharge Plan B Home (P)      Discharge Plan discussed with: Patient (P)      Transition of Care Barriers None (P)

## 2025-02-11 NOTE — ED NOTES
Pt resting in bed, respirations even and unlabored. Aaox4. States her pain is 5/10 and she feels like she may need to have a bowel movement. Visitor at bedside and call light in reach.

## 2025-02-11 NOTE — PROGRESS NOTES
Dr. Leach at bedside. Per MD, ok to give vancomycin early so patient can be discharged after administration.

## 2025-02-11 NOTE — CARE UPDATE
Resident to bedside for PM assessment.     Patient resting comfortably. She notes some pelvic cramping and discomfort. No nausea, vomiting, back pain, headache or SOB.     Temp:  [97.8 °F (36.6 °C)-99.2 °F (37.3 °C)] 98.1 °F (36.7 °C)  Pulse:  [58-76] 66  Resp:  [11-22] 18  SpO2:  [95 %-99 %] 96 %  BP: (100-132)/(60-75) 104/69    On Exam: NAD. Abdomen soft, mildly tender. No distension, guarding or rebound.     Plan:   - Counseled patient on option of discharge after 2nd dose of vancomycin is complete  - Patient notes she would like to stay one more night, plan for dc tomorrow  - Per urology recommendation, will discharge home with tatiana Leach MD  OBGYN   PGY-1

## 2025-02-11 NOTE — CONSULTS
[facility] - Emergency Dept  Urology  Consult Note    Patient Name: Shara Cordoba  MRN: [id number]  Admission Date: 2/10/2025  Hospital Length of Stay: 0   Code Status: Full Code   Attending Provider: Nick Adkins MD   Consulting Provider: Sami Mark MD  Primary Care Physician: Gisela Giordano MD  Principal Problem:<principal problem not specified>    Inpatient consult to Urology  Consult performed by: Sami Mark MD  Consult ordered by: Robyn Hameed MD  Reason for consult: s/p bladder injury with leon in place          Subjective:     HPI:  Shara Cordoba is a 44  year old women who is s/p vaginal hysterectomy on 2/3/2025. She was discharged with no leon catheter in place whoever presented to the ED on 2/6/2025 due abdominal pain. She was found to be in urinary retention, a leon catheter was replaced. Ct cystography showed concerns for extravasation of contrast from the posterior aspect of the bladder. She underwent a cystoscopy, cystogram and bilateral retrograde pyelograms on 2/8/2025 which was negative. She represents today due to lower abdominal pain which came back last night. Urology consulted for possible UTI. The patient reports to have been taking Ciprofloxacin and Azo as prescribed.     On assessment the patient is hemodynamically stable, afebrile and responding appropriately. Lower abdominal pain on physical exam. Leon catheter draining orange tinged urine.     No new imaging.     Past Medical History:   Diagnosis Date    GERD (gastroesophageal reflux disease)     Hypertension     Hypothyroidism     Peptic ulcer     Wears contact lenses        Past Surgical History:   Procedure Laterality Date    COLONOSCOPY      CYSTOSCOPY      CYSTOURETEROSCOPY WITH RETROGRADE PYELOGRAPHY AND INSERTION OF STENT INTO URETER Bilateral 2/8/2025    Procedure: CYSTOURETEROSCOPY, WITH RETROGRADE PYELOGRAM AND URETERAL STENT INSERTION;  Surgeon: Fransisco Adorno MD;  Location: [facility];  Service:  Urology;  Laterality: Bilateral;    ESOPHAGOGASTRODUODENOSCOPY      HIP SURGERY Left     HYSTERECTOMY, VAGINAL, LAPAROSCOPY-ASSISTED, WITH SALPINGECTOMY Bilateral 2/3/2025    Procedure: HYSTERECTOMY,VAGINAL,LAPAROSCOPY-ASSISTED,WITH SALPINGECTOMY;  Surgeon: Klaus Diaz MD;  Location: [facility] OR;  Service: OB/GYN;  Laterality: Bilateral;    HYSTEROSCOPIC POLYPECTOMY OF UTERUS N/A 12/1/2023    Procedure: POLYPECTOMY, UTERUS, HYSTEROSCOPIC;  Surgeon: Marita Dupont MD;  Location: [facility] OR;  Service: OB/GYN;  Laterality: N/A;    HYSTEROSCOPY WITH DILATION AND CURETTAGE OF UTERUS N/A 12/1/2023    Procedure: HYSTEROSCOPY, WITH DILATION AND CURETTAGE OF UTERUS;  Surgeon: Marita Dupont MD;  Location: [facility] OR;  Service: OB/GYN;  Laterality: N/A;  w/TrueClear  2nd case per Moraima    PARATHYROIDECTOMY      THERMAL ABLATION OF ENDOMETRIUM USING HYSTEROSCOPY N/A 12/1/2023    Procedure: ABLATION, ENDOMETRIUM, THERMAL, HYSTEROSCOPIC;  Surgeon: Marita Dupont MD;  Location: [facility] OR;  Service: OB/GYN;  Laterality: N/A;       Review of patient's allergies indicates:   Allergen Reactions    Amlodipine Edema, Other (See Comments) and Swelling    Meloxicam Edema, Other (See Comments), Palpitations and Swelling    Bactrim [sulfamethoxazole-trimethoprim] Other (See Comments)     Persistant yeast infection     Pcn [penicillins]      Unsure of reaction, was a child    Prednisone      High blood pressure        Family History       Problem Relation (Age of Onset)    Breast cancer Maternal Grandmother    No Known Problems Mother, Father            Tobacco Use    Smoking status: Never    Smokeless tobacco: Never   Substance and Sexual Activity    Alcohol use: Yes     Comment: occ.    Drug use: No    Sexual activity: Yes     Partners: Male     Birth control/protection: Partner-Vasectomy       Review of Systems    Objective:     Temp:  [97.8 °F (36.6 °C)-98.2 °F (36.8 °C)] 98.2 °F (36.8 °C)  Pulse:  [58-76] 58  Resp:  [14-22]  18  SpO2:  [95 %-97 %] 95 %  BP: (100-132)/(62-75) 103/64  Weight: 74.8 kg (165 lb)  Body mass index is 30.18 kg/m².    Date 02/11/25 0700 - 02/12/25 0659   Shift 9825-5356 2508-2593 8084-4577 24 Hour Total   INTAKE   IV Piggyback 500   500   Shift Total(mL/kg) 500(6.7)   500(6.7)   OUTPUT   Shift Total(mL/kg)       Weight (kg) 74.8 74.8 74.8 74.8          Drains       Drain  Duration                  Urethral Catheter 02/08/25 1019 Non-latex;Straight-tip 20 Fr. 2 days                     Physical Exam  Vitals reviewed.   Constitutional:       Appearance: Normal appearance.   Abdominal:      Tenderness: There is abdominal tenderness.      Comments: Lower abdominal pain tenderness.   Genitourinary:     Comments: Fofana catheter in place draining orange tinged urine.  Skin:     General: Skin is warm.      Capillary Refill: Capillary refill takes less than 2 seconds.   Neurological:      General: No focal deficit present.      Mental Status: She is alert and oriented to person, place, and time.   Psychiatric:         Mood and Affect: Mood normal.          Significant Labs:    BMP:  Recent Labs   Lab 02/06/25  2152 02/07/25  0317 02/10/25  2351    140 138   K 4.4 4.2 4.4    101 104   CO2 26 30* 22*   BUN 15 12 14   CREATININE 1.0 0.8 0.8   CALCIUM 9.3 9.7 10.1       CBC:  Recent Labs   Lab 02/06/25  1812 02/07/25  0317 02/10/25  2351   WBC 10.54 8.66 12.39   HGB 12.6 11.3* 11.6*   HCT 37.8 34.2* 34.7*    320 377       Blood Culture:   Recent Labs   Lab 02/10/25  2351   LABBLOO No Growth to date     Urine Culture:   Recent Labs   Lab 02/06/25  1356   LABURIN ENTEROCOCCUS FAECALIS  >100,000 cfu/ml  *     Urine Studies:   Recent Labs   Lab 02/06/25  1356 02/10/25  2356   COLORU Yellow Yellow   APPEARANCEUA Clear Clear   PHUR 7.0 8.0   SPECGRAV 1.005 1.005   PROTEINUA Negative Negative   GLUCUA Negative Negative   KETONESU Negative Negative   BILIRUBINUA 1+* Negative   OCCULTUA 3+* Negative   NITRITE  Positive* Positive*   UROBILINOGEN  --  Negative   LEUKOCYTESUR 2+* Negative   RBCUA 23*  --    WBCUA 23* 1   BACTERIA Rare Few*   SQUAMEPITHEL 6  --        Significant Imaging:  All pertinent imaging results/findings from the past 24 hours have been reviewed.                    Assessment and Plan:     Bladder injury  Shara Cordoba is a 44 year old women that had a suspected bladder injury during vaginal hysterectomy. She is currently POD#8    - Maintain leon catheter   - Recommend Macrobid based on 2/6/2025 culture   - Outpatient Urology follow up scheduled for 2/25/2025  - Recommend Ditropan and Pyridium for symptom relief. On note urine will appear to be nitrite positive when taking Pyridium.   - Okay for discharge from Urologic perspective   - Rest of care per primary team         VTE Risk Mitigation (From admission, onward)           Ordered     IP VTE HIGH RISK PATIENT  Once         02/11/25 0359     Place sequential compression device  Until discontinued         02/11/25 0359                    Thank you for your consult. I will sign off. Please contact us if you have any additional questions.    Sami Mark MD  Urology  [facility] - Emergency Dept

## 2025-02-11 NOTE — DISCHARGE SUMMARY
Discharge Summary  Gynecology      Admit Date: 2/10/2025    Discharge Date and Time: 2025     Attending Physician: Nick Adkins MD    Principal Diagnoses:   Cystitis    Active Hospital Problems    Diagnosis  POA    *Cystitis [N30.90]  Unknown    Bladder injury [S37.20XA]  Yes    Hypertension [I10]  Yes     Chronic    S/P VNOTES (vaginal hysterectomy, R salpingectomy, L fimbria removal) [Z90.710]  Yes      Resolved Hospital Problems   No resolved problems to display.       Procedures:   * No surgery found *    Discharged Condition: good    Hospital Course:   Shara Cordoba is a 44 y.o. y.o.  female POD#9 s/p VNOTES/BS (c/b 2mm cystomy) who presented on 2/10/2025 with fevers and abdominal pain. She was admitted for treatment of cystitis. PMH is significant for HTN. Patient was treated with IV vancomycin which she tolerated well.     Of note, patient was previously admitted for pain and fever on POD #3. At that time, urology performed cystoureteroscopy and patient was found to have 2 mm cystotomy. She was discharged home with leon and represented on 2/10.     On day of discharge (POD#9, HD#2), patient was in stable condition. She was urinating via leon, ambulating, and tolerating a regular diet without nausea/vomiting. Pain was well-controlled on oral medication. She was discharged with macrobid (x 7 days), lotrisone and leon in place. She has scheduled follow up with Urology on 25 and Dr. Diaz on 3/17/25.     Consults: None    Significant Diagnostic Studies:  Recent Labs   Lab 25  1812 25  0317 02/10/25  2351   WBC 10.54 8.66 12.39   HGB 12.6 11.3* 11.6*   HCT 37.8 34.2* 34.7*   MCV 95 96 95    320 377        Treatments:   1. Vancomycin IV    Disposition: Home or Self Care    Patient Instructions:   Current Discharge Medication List        START taking these medications    Details   clotrimazole-betamethasone 1-0.05% (LOTRISONE) cream Apply topically 2 (two) times  daily.  Qty: 45 g, Refills: 0      nitrofurantoin, macrocrystal-monohydrate, (MACROBID) 100 MG capsule Take 1 capsule (100 mg total) by mouth 2 (two) times daily. for 7 days  Qty: 14 capsule, Refills: 0           CONTINUE these medications which have CHANGED    Details   phenazopyridine (PYRIDIUM) 100 MG tablet Take 2 tablets (200 mg total) by mouth 3 (three) times daily as needed for Pain.  Qty: 42 tablet, Refills: 0           CONTINUE these medications which have NOT CHANGED    Details   losartan (COZAAR) 100 MG tablet Take 100 mg by mouth once daily.      acetaminophen (TYLENOL) 650 MG TbSR Take 1 tablet (650 mg total) by mouth every 8 (eight) hours.  Qty: 30 tablet, Refills: 0      biotin 10,000 mcg Cap Take 1 capsule by mouth Daily.      cholecalciferol, vitamin D3, (VITAMIN D3) 50 mcg (2,000 unit) Tab Take 1 tablet by mouth once daily.      docusate sodium (COLACE) 100 MG capsule Take 1 capsule (100 mg total) by mouth 2 (two) times daily.  Qty: 60 capsule, Refills: 0      hydroCHLOROthiazide (HYDRODIURIL) 25 MG tablet Take 25 mg by mouth once daily.      Lactobacillus acidophilus (PROBIOTIC ORAL) Take 1 capsule by mouth once daily. Raw probiotic vaginal care  50 billion cfu      levothyroxine (SYNTHROID) 88 MCG tablet Take 88 mcg by mouth before breakfast.      magnesium gluconate 27.5 mg magne- sium (500 mg) Tab Take 120 mg by mouth nightly. For sleep      nebivoloL (BYSTOLIC) 10 MG Tab Take 10 mg by mouth 2 (two) times a day.      pantoprazole (PROTONIX) 40 MG tablet Take 40 mg by mouth once daily.      polyethylene glycol (GLYCOLAX) 17 gram PwPk Take 17 g by mouth 2 (two) times daily. for 14 days  Qty: 28 each, Refills: 0      traMADoL (ULTRAM) 50 mg tablet Take 1 tablet (50 mg total) by mouth every 4 (four) hours as needed for Pain.  Qty: 28 tablet, Refills: 0    Comments: Quantity prescribed more than 7 day supply? No  Associated Diagnoses: Postoperative pain           STOP taking these medications        ciprofloxacin HCl (CIPRO) 750 MG tablet Comments:   Reason for Stopping:               Discharge Procedure Orders   Diet general     Lifting restrictions   Order Comments: No lifting greater than 15 pounds for six weeks.     Other restrictions (specify):   Order Comments: PELVIC REST:  No douching, tampons, or intercourse for 6 weeks.    If prescribed, vaginal estrogen cream may be used during the postoperative period.     DRIVING:  No driving while on narcotics. Driving may be resumed initially with a competent passenger one to two weeks after surgery if no longer taking narcotics.     EXERCISE:  For six weeks your exercise should be limited to walking. You may walk as far as you wish, as long as you increase your level of exertion gradually and avoid slippery surfaces. You may climb stairs as needed to get around, but should not use stair climbing for exercise.     Wound care routine (specify)   Order Comments: WOUND CARE:  If you have a band-aid or bandage on your wound, you may remove it the day after dismissal.  If you had steri-strips remove them once they begin to peel off (usually 2 weeks).  If your steri-strips still haven't come off in 2 weeks, please remove them. You may wash the wound with mild soap and water.   You may shower at any time but should avoid immersing any abdominal incisions in water for at least two weeks after surgery or until the wound is completely healed. If given, please shower with Hibiclens soap until bottle is completely finished. Keep your wound clean and dry.  You should observe your incision for signs of infection which include redness, warmth, drainage or fever.     Call MD for:  temperature >100.4     Call MD for:  persistent nausea and vomiting     Call MD for:  severe uncontrolled pain     Call MD for:  difficulty breathing, headache or visual disturbances     Call MD for:  redness, tenderness, or signs of infection (pain, swelling, redness, odor or green/yellow discharge  around incision site)     Call MD for:  hives     Call MD for:   Order Comments: inability to void,urine is ketchup colored or you have large clots, vaginal bleeding is heavier than a period.    VAGINAL DISCHARGE: You may develop a vaginal discharge and intermittent vaginal spotting after surgery and up to 6 weeks postoperatively.  The discharge may have an odor and may change in color but it is normal.  This is due to dissolving stiches.  Contact your surgical team if you develop vaginal or vulvar irritation along with a discharge.  Also contact your surgical team if you have vaginal discharge that smells like urine or stool.    CONSTIPATION REMEDIES: Patients are often constipated after surgery or with use of oral narcotic medicine. You should continue to take the stool softener, Senokot-S during the next six weeks, and consume adequate amounts of water.  If you have not had a bowel movement for 3 days after dismissal, or are uncomfortable and unable to pass stool, please try one or all of the following measures:  1.  Milk of Magnesia - 30 cc by mouth every 12 hours   2.  Dulcolax suppository - One suppository per rectum every 4-6 hours   3.  Metamucil, Fibercon or other bulk former - use as directed  4.  Fleets Enema        PAIN MEDICATIONS:     Take your pain medications as instructed. It is best to take pain medications before your pain becomes severe. This will allow you to take less medication yet have better pain relief. For the first 2 or 3 days it may be helpful to take your pain medications on a regular schedule (e.g. every 4 to 6 hours). This will help you to keep your pain under better control. You should then begin to take fewer medications each day until you no longer need them. Do not take pain medication on an empty stomach. This may lead to nausea and vomiting.     Activity as tolerated   Order Comments: Return to normal activity slowly as you feel able.  For 6 weeks your exercise should be limited  to walking.  You may walk as far as you wish, as long as you increase your level of exertion gradually and avoid slippery surfaces.    If you had a hysterectomy at the surgery do not insert anything in your vagina for 9 weeks.           Nemo Leach MD  OBGYN   PGY-1

## 2025-02-11 NOTE — HPI
Shara Cordoba is a 44  year old women who is s/p vaginal hysterectomy on 2/3/2025. She was discharged with no leon catheter in place whoever presented to the ED on 2/6/2025 due abdominal pain. She was found to be in urinary retention, a leon catheter was replaced. Ct cystography showed concerns for extravasation of contrast from the posterior aspect of the bladder. She underwent a cystoscopy, cystogram and bilateral retrograde pyelograms on 2/8/2025 which was negative. She represents today due to lower abdominal pain which came back last night. Urology consulted for possible UTI. The patient reports to have been taking Ciprofloxacin and Azo as prescribed.     On assessment the patient is hemodynamically stable, afebrile and responding appropriately. Lower abdominal pain on physical exam. Leon catheter draining orange tinged urine.     No new imaging.

## 2025-02-11 NOTE — SUBJECTIVE & OBJECTIVE
Past Medical History:   Diagnosis Date    GERD (gastroesophageal reflux disease)     Hypertension     Hypothyroidism     Peptic ulcer     Wears contact lenses        Past Surgical History:   Procedure Laterality Date    COLONOSCOPY      CYSTOSCOPY      CYSTOURETEROSCOPY WITH RETROGRADE PYELOGRAPHY AND INSERTION OF STENT INTO URETER Bilateral 2/8/2025    Procedure: CYSTOURETEROSCOPY, WITH RETROGRADE PYELOGRAM AND URETERAL STENT INSERTION;  Surgeon: Fransisco Adorno MD;  Location: [facility];  Service: Urology;  Laterality: Bilateral;    ESOPHAGOGASTRODUODENOSCOPY      HIP SURGERY Left     HYSTERECTOMY, VAGINAL, LAPAROSCOPY-ASSISTED, WITH SALPINGECTOMY Bilateral 2/3/2025    Procedure: HYSTERECTOMY,VAGINAL,LAPAROSCOPY-ASSISTED,WITH SALPINGECTOMY;  Surgeon: Klaus Diaz MD;  Location: [facility] OR;  Service: OB/GYN;  Laterality: Bilateral;    HYSTEROSCOPIC POLYPECTOMY OF UTERUS N/A 12/1/2023    Procedure: POLYPECTOMY, UTERUS, HYSTEROSCOPIC;  Surgeon: Marita Dupont MD;  Location: [facility] OR;  Service: OB/GYN;  Laterality: N/A;    HYSTEROSCOPY WITH DILATION AND CURETTAGE OF UTERUS N/A 12/1/2023    Procedure: HYSTEROSCOPY, WITH DILATION AND CURETTAGE OF UTERUS;  Surgeon: Marita Dupont MD;  Location: [facility] OR;  Service: OB/GYN;  Laterality: N/A;  w/TrueClear  2nd case per Moraima    PARATHYROIDECTOMY      THERMAL ABLATION OF ENDOMETRIUM USING HYSTEROSCOPY N/A 12/1/2023    Procedure: ABLATION, ENDOMETRIUM, THERMAL, HYSTEROSCOPIC;  Surgeon: Marita Dupont MD;  Location: [facility] OR;  Service: OB/GYN;  Laterality: N/A;       Review of patient's allergies indicates:   Allergen Reactions    Amlodipine Edema, Other (See Comments) and Swelling    Meloxicam Edema, Other (See Comments), Palpitations and Swelling    Bactrim [sulfamethoxazole-trimethoprim] Other (See Comments)     Persistant yeast infection     Pcn [penicillins]      Unsure of reaction, was a child    Prednisone      High blood pressure        Family History        Problem Relation (Age of Onset)    Breast cancer Maternal Grandmother    No Known Problems Mother, Father            Tobacco Use    Smoking status: Never    Smokeless tobacco: Never   Substance and Sexual Activity    Alcohol use: Yes     Comment: occ.    Drug use: No    Sexual activity: Yes     Partners: Male     Birth control/protection: Partner-Vasectomy       Review of Systems    Objective:     Temp:  [97.8 °F (36.6 °C)-98.2 °F (36.8 °C)] 98.2 °F (36.8 °C)  Pulse:  [58-76] 58  Resp:  [14-22] 18  SpO2:  [95 %-97 %] 95 %  BP: (100-132)/(62-75) 103/64  Weight: 74.8 kg (165 lb)  Body mass index is 30.18 kg/m².    Date 02/11/25 0700 - 02/12/25 0659   Shift 9101-5798 2808-6983 2802-1030 24 Hour Total   INTAKE   IV Piggyback 500   500   Shift Total(mL/kg) 500(6.7)   500(6.7)   OUTPUT   Shift Total(mL/kg)       Weight (kg) 74.8 74.8 74.8 74.8          Drains       Drain  Duration                  Urethral Catheter 02/08/25 1019 Non-latex;Straight-tip 20 Fr. 2 days                     Physical Exam  Vitals reviewed.   Constitutional:       Appearance: Normal appearance.   Abdominal:      Tenderness: There is abdominal tenderness.      Comments: Lower abdominal pain tenderness.   Genitourinary:     Comments: Fofana catheter in place draining orange tinged urine.  Skin:     General: Skin is warm.      Capillary Refill: Capillary refill takes less than 2 seconds.   Neurological:      General: No focal deficit present.      Mental Status: She is alert and oriented to person, place, and time.   Psychiatric:         Mood and Affect: Mood normal.          Significant Labs:    BMP:  Recent Labs   Lab 02/06/25  2152 02/07/25  0317 02/10/25  2351    140 138   K 4.4 4.2 4.4    101 104   CO2 26 30* 22*   BUN 15 12 14   CREATININE 1.0 0.8 0.8   CALCIUM 9.3 9.7 10.1       CBC:  Recent Labs   Lab 02/06/25  1812 02/07/25  0317 02/10/25  2351   WBC 10.54 8.66 12.39   HGB 12.6 11.3* 11.6*   HCT 37.8 34.2* 34.7*     320 377       Blood Culture:   Recent Labs   Lab 02/10/25  2351   LABBLOO No Growth to date     Urine Culture:   Recent Labs   Lab 02/06/25  1356   LABURIN ENTEROCOCCUS FAECALIS  >100,000 cfu/ml  *     Urine Studies:   Recent Labs   Lab 02/06/25  1356 02/10/25  2356   COLORU Yellow Yellow   APPEARANCEUA Clear Clear   PHUR 7.0 8.0   SPECGRAV 1.005 1.005   PROTEINUA Negative Negative   GLUCUA Negative Negative   KETONESU Negative Negative   BILIRUBINUA 1+* Negative   OCCULTUA 3+* Negative   NITRITE Positive* Positive*   UROBILINOGEN  --  Negative   LEUKOCYTESUR 2+* Negative   RBCUA 23*  --    WBCUA 23* 1   BACTERIA Rare Few*   SQUAMEPITHEL 6  --        Significant Imaging:  All pertinent imaging results/findings from the past 24 hours have been reviewed.

## 2025-02-12 VITALS
HEART RATE: 62 BPM | RESPIRATION RATE: 16 BRPM | BODY MASS INDEX: 30.36 KG/M2 | DIASTOLIC BLOOD PRESSURE: 69 MMHG | SYSTOLIC BLOOD PRESSURE: 106 MMHG | OXYGEN SATURATION: 95 % | WEIGHT: 165 LBS | TEMPERATURE: 99 F | HEIGHT: 62 IN

## 2025-02-12 LAB — BACTERIA UR CULT: NORMAL

## 2025-02-12 PROCEDURE — 99239 HOSP IP/OBS DSCHRG MGMT >30: CPT | Mod: ,,, | Performed by: OBSTETRICS & GYNECOLOGY

## 2025-02-12 PROCEDURE — 25000003 PHARM REV CODE 250

## 2025-02-12 PROCEDURE — 96376 TX/PRO/DX INJ SAME DRUG ADON: CPT

## 2025-02-12 PROCEDURE — 63600175 PHARM REV CODE 636 W HCPCS

## 2025-02-12 PROCEDURE — G0378 HOSPITAL OBSERVATION PER HR: HCPCS

## 2025-02-12 RX ORDER — CLOTRIMAZOLE AND BETAMETHASONE DIPROPIONATE 10; .64 MG/G; MG/G
CREAM TOPICAL 2 TIMES DAILY
Qty: 45 G | Refills: 0 | Status: SHIPPED | OUTPATIENT
Start: 2025-02-12

## 2025-02-12 RX ORDER — FLUCONAZOLE 150 MG/1
150 TABLET ORAL ONCE
Status: COMPLETED | OUTPATIENT
Start: 2025-02-12 | End: 2025-02-12

## 2025-02-12 RX ADMIN — POLYETHYLENE GLYCOL 3350 17 G: 17 POWDER, FOR SOLUTION ORAL at 08:02

## 2025-02-12 RX ADMIN — ACETAMINOPHEN 650 MG: 325 TABLET, FILM COATED ORAL at 02:02

## 2025-02-12 RX ADMIN — PHENAZOPYRIDINE 200 MG: 200 TABLET ORAL at 12:02

## 2025-02-12 RX ADMIN — ACETAMINOPHEN 650 MG: 325 TABLET, FILM COATED ORAL at 11:02

## 2025-02-12 RX ADMIN — PHENAZOPYRIDINE 200 MG: 200 TABLET ORAL at 08:02

## 2025-02-12 RX ADMIN — OXYCODONE HYDROCHLORIDE 5 MG: 5 TABLET ORAL at 11:02

## 2025-02-12 RX ADMIN — FLUCONAZOLE 150 MG: 150 TABLET ORAL at 02:02

## 2025-02-12 RX ADMIN — SENNOSIDES AND DOCUSATE SODIUM 1 TABLET: 8.6; 5 TABLET ORAL at 08:02

## 2025-02-12 RX ADMIN — PANTOPRAZOLE SODIUM 40 MG: 40 INJECTION, POWDER, FOR SOLUTION INTRAVENOUS at 08:02

## 2025-02-12 RX ADMIN — LEVOTHYROXINE SODIUM 88 MCG: 88 TABLET ORAL at 07:02

## 2025-02-12 RX ADMIN — NITROFURANTOIN MONOHYDRATE/MACROCRYSTALS 100 MG: 25; 75 CAPSULE ORAL at 08:02

## 2025-02-12 RX ADMIN — OXYCODONE HYDROCHLORIDE 5 MG: 5 TABLET ORAL at 05:02

## 2025-02-12 RX ADMIN — OXYCODONE HYDROCHLORIDE 5 MG: 5 TABLET ORAL at 12:02

## 2025-02-12 NOTE — PROGRESS NOTES
Progress Note  Gynecology    Admit Date: 2/10/2025  LOS: 0    Reason for Admission:  Cystitis    SUBJECTIVE:     Shara Cordoba is a 44 y.o. y.o.  female POD#9 s/p VNOTES/BS (c/b 2mm cystomy) and HD#2 who presented on 2/10/2025 with fevers and abdominal pain. She was admitted for treatment of cystitis.     Pt doing well this morning. She notes continued intermittent bladder spasms that are improved with pyridium and oxycodone. She also notes labial discomfort at site where leon inserts. She notes feeling slight SOB with walking, none at rest. She is tolerating a regular diet without N/V. Ambulating without difficulty. Voiding via leon.     OBJECTIVE:     Vital Signs   Temp:  [97.9 °F (36.6 °C)-99.2 °F (37.3 °C)] 98.1 °F (36.7 °C)  Pulse:  [58-70] 60  Resp:  [16-19] 18  SpO2:  [95 %-99 %] 97 %  BP: (100-110)/(56-69) 108/59      Intake/Output Summary (Last 24 hours) at 2025 0614  Last data filed at 2025 0215  Gross per 24 hour   Intake 500 ml   Output 1250 ml   Net -750 ml       Physical Exam:  Gen: A&Ox3, NAD  CV: RRR  Pulm: LCTAB, normal respiratory effort  Abd: soft, non-distended, non-tender to palpation without rebound or guarding  Ext: PPP, no peripheral edema  : Leon in place draining orange, pyridium-strained urine     Laboratory:  Recent Labs   Lab 25  1812 25  0317 02/10/25  2351   WBC 10.54 8.66 12.39   HGB 12.6 11.3* 11.6*   HCT 37.8 34.2* 34.7*   MCV 95 96 95    320 377          Diagnostic Results:  No new imaging on this admission    ASSESSMENT/PLAN:     Active Hospital Problems    Diagnosis  POA    *Cystitis [N30.90]  Unknown    Bladder injury [S37.20XA]  Yes    Hypertension [I10]  Yes     Chronic    S/P VNOTES (vaginal hysterectomy, R salpingectomy, L fimbria removal) [Z90.710]  Yes      Resolved Hospital Problems   No resolved problems to display.       Assessment: Shara Cordoba is a 44 y.o. y.o.  female POD#9 s/p VNOTES/BS (c/b 2mm  cystomy) and HD#2 admitted for treatment of cystitis.     Plan:     1. Cystitis  - Patient is HD#2, initially presented to the ED with pain, fever, UA +nitrites  - VSS, CBC WBC 12.39 H/H stable at 11.6/34.7  - S/p 2 doses of IV vancomycin  - Discharge home on macrobid, urology in agreement  - Discharge home with Cem leonrisone for irritation   - Medication to bedside  - Patient has follow up w/Dr. Diaz on 3/17 and Urology on 2/25  - Patient in agreement with plan    2. POD#9 s/p VNOTES/BS (c/b 2mm cystomy)   - Patient appropriately meeting milestones  - Routine post-op advances  - Continue PRN pain medications  - Encourage ambulation and IS  - CBC stable - H/H 11.6/34.7  - UOP adequate     Dispo: As patient meets appropriate post-op milestones, plan for discharge to home today.    Nemo Leach MD  OBGYN   PGY-1

## 2025-02-14 ENCOUNTER — PATIENT MESSAGE (OUTPATIENT)
Dept: OBSTETRICS AND GYNECOLOGY | Facility: CLINIC | Age: 45
End: 2025-02-14
Payer: COMMERCIAL

## 2025-02-14 ENCOUNTER — NURSE TRIAGE (OUTPATIENT)
Dept: ADMINISTRATIVE | Facility: CLINIC | Age: 45
End: 2025-02-14
Payer: COMMERCIAL

## 2025-02-14 DIAGNOSIS — B37.9 YEAST INFECTION: Primary | ICD-10-CM

## 2025-02-14 RX ORDER — FLUCONAZOLE 150 MG/1
150 TABLET ORAL
Qty: 3 TABLET | Refills: 0 | Status: SHIPPED | OUTPATIENT
Start: 2025-02-14

## 2025-02-14 RX ORDER — TERCONAZOLE 4 MG/G
1 CREAM VAGINAL NIGHTLY
Qty: 45 G | Refills: 1 | Status: SHIPPED | OUTPATIENT
Start: 2025-02-14 | End: 2025-02-21

## 2025-02-14 NOTE — TELEPHONE ENCOUNTER
[place]    PCP:  Dr. Gisela Giordano    S/P Laparoscopy Assisted Hysterectomy with Salpingectomy on 2/3/25 with Klaus Diaz MD.  She is taking antibiotics right now (Macrobid) and also taking Lotrisone cream (she reports that she feels like it's making it worse).  She is calling for a different cream and a couple of Diflucan.  C/O internal vaginal itching, ongoing abdominal cramping (taking Gas X which is helping - better than it was), and ongoing clear to pale yellow vaginal discharge (she states is normal post-op).  Denies fever, vaginal bleeding, vomiting, and constipation.  She does have an indwelling urinary catheter.  Pharmacy:  [facility]/pharmacy #[id number] - [place], [place] - [address].  Per protocol, care advised is call Surgeon now.  NT paged [facility] 1st call Resident OCP.  OCP, Dr. Patton, called NT back and will send a new cream in for her as well as Diflucan to the pharmacy of Pts choice.  NT contacted Pt and notified of OCP recommendations.  Pt VU.  Advised to call for prescription issues/worsening/questions/concerns.  VU.    Reason for Disposition   [1] Caller has URGENT question AND [2] triager unable to answer question    Additional Information   Negative: Sounds like a life-threatening emergency to the triager   Negative: [1] Widespread rash AND [2] bright red, sunburn-like   Negative: [1] SEVERE headache AND [2] after spinal (epidural) anesthesia   Negative: [1] Vomiting AND [2] persists > 4 hours   Negative: [1] Vomiting AND [2] abdomen looks much more swollen than usual   Negative: [1] Drinking very little AND [2] dehydration suspected (e.g., no urine > 12 hours, very dry mouth, very lightheaded)   Negative: Patient sounds very sick or weak to the triager   Negative: Sounds like a serious complication to the triager   Negative: Fever > 100.4 F (38.0 C)   Negative: [1] SEVERE post-op pain (e.g., excruciating, pain scale 8-10) AND [2] not controlled with pain medications    Protocols used:  Post-Op Symptoms and [id number]

## 2025-02-15 LAB
BACTERIA BLD CULT: NORMAL

## 2025-02-17 ENCOUNTER — TELEPHONE (OUTPATIENT)
Dept: OBSTETRICS AND GYNECOLOGY | Facility: CLINIC | Age: 45
End: 2025-02-17
Payer: COMMERCIAL

## 2025-02-23 NOTE — PROGRESS NOTES
Subjective:      Shara Cordoba is a 44 y.o. female who returns today regarding her     .doing well    Leaks around the leon when she has a BM but no continuous vaginal leakage    No constipation    Completed antibiotocs a few days] ago  Vaginal itching    She is on diflucan currently and topical cream for yeast infection  No fever    No chills        The following portions of the patient's history were reviewed and updated as appropriate: allergies, current medications, past family history, past medical history, past social history, past surgical history and problem list.    Review of Systems  Pertinent items are noted in HPI.  A comprehensive multipoint review of systems was negative except as otherwise stated in the HPI.    Past Medical History:   Diagnosis Date    GERD (gastroesophageal reflux disease)     Hypertension     Hypothyroidism     Peptic ulcer     Wears contact lenses      Past Surgical History:   Procedure Laterality Date    COLONOSCOPY      CYSTOSCOPY      CYSTOURETEROSCOPY WITH RETROGRADE PYELOGRAPHY AND INSERTION OF STENT INTO URETER Bilateral 2/8/2025    Procedure: CYSTOURETEROSCOPY, WITH RETROGRADE PYELOGRAM AND URETERAL STENT INSERTION;  Surgeon: Fransisco Adorno MD;  Location: [facility];  Service: Urology;  Laterality: Bilateral;    ESOPHAGOGASTRODUODENOSCOPY      HIP SURGERY Left     HYSTERECTOMY, VAGINAL, LAPAROSCOPY-ASSISTED, WITH SALPINGECTOMY Bilateral 2/3/2025    Procedure: HYSTERECTOMY,VAGINAL,LAPAROSCOPY-ASSISTED,WITH SALPINGECTOMY;  Surgeon: Klaus Diaz MD;  Location: [facility];  Service: OB/GYN;  Laterality: Bilateral;    HYSTEROSCOPIC POLYPECTOMY OF UTERUS N/A 12/1/2023    Procedure: POLYPECTOMY, UTERUS, HYSTEROSCOPIC;  Surgeon: Marita Dupont MD;  Location: [facility] OR;  Service: OB/GYN;  Laterality: N/A;    HYSTEROSCOPY WITH DILATION AND CURETTAGE OF UTERUS N/A 12/1/2023    Procedure: HYSTEROSCOPY, WITH DILATION AND CURETTAGE OF UTERUS;  Surgeon: Marita Dupont MD;   Location: [facility] OR;  Service: OB/GYN;  Laterality: N/A;  w/TrueClear  2nd case per Moraima    PARATHYROIDECTOMY      THERMAL ABLATION OF ENDOMETRIUM USING HYSTEROSCOPY N/A 12/1/2023    Procedure: ABLATION, ENDOMETRIUM, THERMAL, HYSTEROSCOPIC;  Surgeon: Marita Dupont MD;  Location: [facility] OR;  Service: OB/GYN;  Laterality: N/A;       Review of patient's allergies indicates:   Allergen Reactions    Amlodipine Edema, Other (See Comments) and Swelling    Meloxicam Edema, Other (See Comments), Palpitations and Swelling    Bactrim [sulfamethoxazole-trimethoprim] Other (See Comments)     Persistant yeast infection     Pcn [penicillins]      Unsure of reaction, was a child    Prednisone      High blood pressure           Objective:   Vitals: LMP 01/28/2025     Physical Exam   General: alert and oriented, no acute distress  Respiratory: Symmetric expansion, non-labored breathing  Cardiovascular: no peripheral edema  Abdomen: non distended  Skin: normal coloration and turgor, no rashes, no suspicious skin lesions noted  Neuro: no gross deficits  Psych: normal judgment and insight, normal mood/affect, and non-anxious    Physical Exam    Lab Review   Urinalysis demonstrates   Lab Results   Component Value Date    WBC 12.39 02/10/2025    HGB 11.6 (L) 02/10/2025    HCT 34.7 (L) 02/10/2025    MCV 95 02/10/2025     02/10/2025     Lab Results   Component Value Date    CREATININE 0.8 02/10/2025    BUN 14 02/10/2025       Imaging  -    Assessment and Plan:   Injury of bladder, subsequent encounter      UA and reflex cs from leon  CT Cystogram 2/28 then see Dr Adorno for possible voiding trial  Will eval for resolution of yeast infection at the time of the voiding trial    Refill diflucan

## 2025-02-25 ENCOUNTER — OFFICE VISIT (OUTPATIENT)
Dept: UROLOGY | Facility: CLINIC | Age: 45
End: 2025-02-25
Payer: COMMERCIAL

## 2025-02-25 VITALS
HEART RATE: 57 BPM | DIASTOLIC BLOOD PRESSURE: 76 MMHG | WEIGHT: 164.88 LBS | OXYGEN SATURATION: 97 % | HEIGHT: 62 IN | SYSTOLIC BLOOD PRESSURE: 113 MMHG | BODY MASS INDEX: 30.34 KG/M2

## 2025-02-25 DIAGNOSIS — S37.20XD INJURY OF BLADDER, SUBSEQUENT ENCOUNTER: Primary | ICD-10-CM

## 2025-02-25 DIAGNOSIS — B37.9 YEAST INFECTION: ICD-10-CM

## 2025-02-25 LAB
BACTERIA #/AREA URNS AUTO: NORMAL /HPF
BILIRUB UR QL STRIP: ABNORMAL
CLARITY UR REFRACT.AUTO: CLEAR
COLOR UR AUTO: ABNORMAL
GLUCOSE UR QL STRIP: NEGATIVE
HGB UR QL STRIP: NEGATIVE
KETONES UR QL STRIP: NEGATIVE
LEUKOCYTE ESTERASE UR QL STRIP: NEGATIVE
MICROSCOPIC COMMENT: NORMAL
NITRITE UR QL STRIP: POSITIVE
PH UR STRIP: 6 [PH] (ref 5–8)
PROT UR QL STRIP: NEGATIVE
RBC #/AREA URNS AUTO: 1 /HPF (ref 0–4)
SP GR UR STRIP: 1.02 (ref 1–1.03)
URN SPEC COLLECT METH UR: ABNORMAL
WBC #/AREA URNS AUTO: 0 /HPF (ref 0–5)

## 2025-02-25 PROCEDURE — 81001 URINALYSIS AUTO W/SCOPE: CPT | Performed by: UROLOGY

## 2025-02-25 PROCEDURE — 99214 OFFICE O/P EST MOD 30 MIN: CPT | Mod: S$GLB,,, | Performed by: UROLOGY

## 2025-02-25 RX ORDER — FLUCONAZOLE 150 MG/1
150 TABLET ORAL
Qty: 3 TABLET | Refills: 0 | Status: SHIPPED | OUTPATIENT
Start: 2025-02-25

## 2025-02-25 NOTE — Clinical Note
UA and reflex cs from leon (not from the bag) Please give her a new leon bag CT cystogram on Fri 2/28 then see Dr Adorno for pelvic exam and voiding trial

## 2025-02-26 ENCOUNTER — PATIENT MESSAGE (OUTPATIENT)
Dept: UROLOGY | Facility: CLINIC | Age: 45
End: 2025-02-26
Payer: COMMERCIAL

## 2025-02-26 RX ORDER — CIPROFLOXACIN 500 MG/1
500 TABLET ORAL ONCE
Qty: 1 TABLET | Refills: 0 | Status: SHIPPED | OUTPATIENT
Start: 2025-02-26 | End: 2025-02-26

## 2025-02-26 NOTE — PROGRESS NOTES
Subjective:      Shara Cordoba is a 44 y.o. female who returns today regarding her     Here for voiding trial    Doing well    Yeast vaginitis improved    Completed her course of antibitoics  Repeat urine cs neg  .    The following portions of the patient's history were reviewed and updated as appropriate: allergies, current medications, past family history, past medical history, past social history, past surgical history and problem list.    Review of Systems  Pertinent items are noted in HPI.  A comprehensive multipoint review of systems was negative except as otherwise stated in the HPI.    Past Medical History:   Diagnosis Date    GERD (gastroesophageal reflux disease)     Hypertension     Hypothyroidism     Peptic ulcer     Wears contact lenses      Past Surgical History:   Procedure Laterality Date    COLONOSCOPY      CYSTOSCOPY      CYSTOURETEROSCOPY WITH RETROGRADE PYELOGRAPHY AND INSERTION OF STENT INTO URETER Bilateral 2/8/2025    Procedure: CYSTOURETEROSCOPY, WITH RETROGRADE PYELOGRAM AND URETERAL STENT INSERTION;  Surgeon: Fransisco Adorno MD;  Location: [facility];  Service: Urology;  Laterality: Bilateral;    ESOPHAGOGASTRODUODENOSCOPY      HIP SURGERY Left     HYSTERECTOMY, VAGINAL, LAPAROSCOPY-ASSISTED, WITH SALPINGECTOMY Bilateral 2/3/2025    Procedure: HYSTERECTOMY,VAGINAL,LAPAROSCOPY-ASSISTED,WITH SALPINGECTOMY;  Surgeon: Klaus Diaz MD;  Location: [facility] OR;  Service: OB/GYN;  Laterality: Bilateral;    HYSTEROSCOPIC POLYPECTOMY OF UTERUS N/A 12/1/2023    Procedure: POLYPECTOMY, UTERUS, HYSTEROSCOPIC;  Surgeon: Marita Dupont MD;  Location: [facility] OR;  Service: OB/GYN;  Laterality: N/A;    HYSTEROSCOPY WITH DILATION AND CURETTAGE OF UTERUS N/A 12/1/2023    Procedure: HYSTEROSCOPY, WITH DILATION AND CURETTAGE OF UTERUS;  Surgeon: Marita Dupont MD;  Location: [facility] OR;  Service: OB/GYN;  Laterality: N/A;  w/TrueClear  2nd case per Moraima    PARATHYROIDECTOMY      THERMAL ABLATION OF  ENDOMETRIUM USING HYSTEROSCOPY N/A 12/1/2023    Procedure: ABLATION, ENDOMETRIUM, THERMAL, HYSTEROSCOPIC;  Surgeon: Marita Dupont MD;  Location: [facility] OR;  Service: OB/GYN;  Laterality: N/A;       Review of patient's allergies indicates:   Allergen Reactions    Amlodipine Edema, Other (See Comments) and Swelling    Meloxicam Edema, Other (See Comments), Palpitations and Swelling    Bactrim [sulfamethoxazole-trimethoprim] Other (See Comments)     Persistant yeast infection     Pcn [penicillins]      Unsure of reaction, was a child    Prednisone      High blood pressure           Objective:   Vitals: LMP 01/28/2025     Physical Exam   General: alert and oriented, no acute distress  Respiratory: Symmetric expansion, non-labored breathing  Cardiovascular: no peripheral edema  Abdomen: non distended  Skin: normal coloration and turgor, no rashes, no suspicious skin lesions noted  Neuro: no gross deficits  Psych: normal judgment and insight, normal mood/affect, and non-anxious    No vaginal discharge    Physical Exam    Lab Review   Urinalysis demonstrates no specimen    Lab Results   Component Value Date    WBC 12.39 02/10/2025    HGB 11.6 (L) 02/10/2025    HCT 34.7 (L) 02/10/2025    MCV 95 02/10/2025     02/10/2025     Lab Results   Component Value Date    CREATININE 0.8 02/10/2025    BUN 14 02/10/2025     UA did not reflex to CS    Component  Ref Range & Units (hover) 1 d ago  (2/25/25) 2 wk ago  (2/10/25) 2 wk ago  (2/6/25)   RBC, UA 1  23 High    WBC, UA 0 1 23 High    Bacteria Occasional Few Abnormal  Rare   Microscopic Comment SEE COMMENT         Imaging      CT cystogram reviewed with radiologist, Dr Barkley  No leak    Assessment and Plan:   Injury of bladder, resolved with leon drainage      Single dose Cipro given prior to CT cystogram    Filled bladder with 200cc NS  Leon removed  Will measure volume voided before she leaves the office    RTC 2 weeks to see NP for UA and PVR  Pelvic rest for  additional 6-8 weeks      Yeast vaginitis; resolving  Cont antifungals

## 2025-02-26 NOTE — PROGRESS NOTES
UA nitritie + but only occasional bacteria and no reflex cs  Will give single dose of cipro prior to cystogram

## 2025-02-28 ENCOUNTER — HOSPITAL ENCOUNTER (OUTPATIENT)
Dept: RADIOLOGY | Facility: HOSPITAL | Age: 45
Discharge: HOME | End: 2025-02-28
Attending: UROLOGY
Payer: COMMERCIAL

## 2025-02-28 ENCOUNTER — TELEPHONE (OUTPATIENT)
Dept: UROLOGY | Facility: CLINIC | Age: 45
End: 2025-02-28
Payer: COMMERCIAL

## 2025-02-28 ENCOUNTER — OFFICE VISIT (OUTPATIENT)
Dept: UROLOGY | Facility: CLINIC | Age: 45
End: 2025-02-28
Payer: COMMERCIAL

## 2025-02-28 ENCOUNTER — PATIENT MESSAGE (OUTPATIENT)
Dept: UROLOGY | Facility: CLINIC | Age: 45
End: 2025-02-28
Payer: COMMERCIAL

## 2025-02-28 VITALS
BODY MASS INDEX: 30.34 KG/M2 | DIASTOLIC BLOOD PRESSURE: 77 MMHG | WEIGHT: 164.88 LBS | SYSTOLIC BLOOD PRESSURE: 113 MMHG | OXYGEN SATURATION: 99 % | HEART RATE: 56 BPM | HEIGHT: 62 IN

## 2025-02-28 DIAGNOSIS — S37.20XD INJURY OF BLADDER, SUBSEQUENT ENCOUNTER: Primary | ICD-10-CM

## 2025-02-28 DIAGNOSIS — T19.9XXA FOREIGN BODY IN GENITOURINARY TRACT, INITIAL ENCOUNTER: ICD-10-CM

## 2025-02-28 DIAGNOSIS — S37.20XD INJURY OF BLADDER, SUBSEQUENT ENCOUNTER: ICD-10-CM

## 2025-02-28 DIAGNOSIS — T19.9XXA FOREIGN BODY IN GENITOURINARY TRACT, INITIAL ENCOUNTER: Primary | ICD-10-CM

## 2025-02-28 DIAGNOSIS — B37.9 YEAST INFECTION: ICD-10-CM

## 2025-02-28 PROCEDURE — 25500020 PHARM REV CODE 255: Performed by: UROLOGY

## 2025-02-28 PROCEDURE — 72194 CT PELVIS W/O & W/DYE: CPT | Mod: TC

## 2025-02-28 RX ADMIN — DIATRIZOATE MEGLUMINE 50 ML: 180 INJECTION, SOLUTION INTRAVESICAL at 10:02

## 2025-02-28 NOTE — TELEPHONE ENCOUNTER
Spoke to [facility] at [facility]- they need different order for CT cystogram w/contrast via leon. Order placed. Confirmed it was correct order with imaging.

## 2025-02-28 NOTE — TELEPHONE ENCOUNTER
"Addressed in another encounter.    ----- Message from Marita sent at 2/28/2025  8:14 AM CST -----  Regarding: FW: Patient Advice    ----- Message -----  From: Helen Hunt  Sent: 2/28/2025   8:11 AM CST  To: Kyree Moody Staff  Subject: Patient Advice                                   Name of Who is Calling:  TERESITA FILOMENA Jara Left The Message:   TERESITA FILOMENA Diaz is the request in detail:       Patient called stating, "she's at [facility] to have CT Scan but the orders needs to be revised to indicate a Cystogram with CT scan."  Patient is there waiting.  Please advise ASAP.   Thank youReply by [facility]: NOPreferred Call Back :  [phone] (Q)  "

## 2025-03-07 ENCOUNTER — OFFICE VISIT (OUTPATIENT)
Dept: UROLOGY | Facility: CLINIC | Age: 45
End: 2025-03-07
Payer: COMMERCIAL

## 2025-03-07 ENCOUNTER — PATIENT MESSAGE (OUTPATIENT)
Dept: OBSTETRICS AND GYNECOLOGY | Facility: CLINIC | Age: 45
End: 2025-03-07
Payer: COMMERCIAL

## 2025-03-07 ENCOUNTER — PATIENT MESSAGE (OUTPATIENT)
Dept: UROLOGY | Facility: CLINIC | Age: 45
End: 2025-03-07

## 2025-03-07 VITALS
WEIGHT: 164.88 LBS | DIASTOLIC BLOOD PRESSURE: 66 MMHG | RESPIRATION RATE: 18 BRPM | HEART RATE: 56 BPM | SYSTOLIC BLOOD PRESSURE: 108 MMHG | BODY MASS INDEX: 30.34 KG/M2 | HEIGHT: 62 IN

## 2025-03-07 DIAGNOSIS — S37.20XD INJURY OF BLADDER, SUBSEQUENT ENCOUNTER: ICD-10-CM

## 2025-03-07 DIAGNOSIS — N36.8 URETHRAL IRRITATION: Primary | ICD-10-CM

## 2025-03-07 PROCEDURE — 87086 URINE CULTURE/COLONY COUNT: CPT | Performed by: NURSE PRACTITIONER

## 2025-03-07 NOTE — PROGRESS NOTES
Subjective:      Shara Cordoba is a 44 y.o. female who returns today for PVR. Established patient of Dr. Adorno' and new to me today.    The patient is s/p vaginal hysterectomy on 2/3/2025. She was discharged with no leon catheter in place whoever presented to the ED on 2/6/2025 due abdominal pain. She was found to be in urinary retention, a leon catheter was replaced. CT cystography showed concerns for extravasation of contrast from the posterior aspect of the bladder. She underwent a cystoscopy, cystogram and bilateral retrograde pyelograms on 2/8/2025 which was negative.     Successful VT on 2/26/25 after cystogram showed no leak.    She returns today for PVR. Reports 1 episode of possible small volume leakage.She reports urethral and perineal soreness/pain, aggravated by sitting. Some frequency and urgency at times. She denies dysuria, gross hematuria, flank pain and fever/chills. Taking diflucan Q3 days- no further discharge.     The following portions of the patient's history were reviewed and updated as appropriate: allergies, current medications, past family history, past medical history, past social history, past surgical history and problem list.    Review of Systems  Constitutional: no fever or chills  ENT: no nasal congestion or sore throat  Respiratory: no cough or shortness of breath  Cardiovascular: no chest pain or palpitations  Gastrointestinal: no nausea or vomiting, tolerating diet  Genitourinary: as per HPI  Hematologic/Lymphatic: no easy bruising or lymphadenopathy  Musculoskeletal: no arthralgias or myalgias  Neurological: no seizures or tremors  Behavioral/Psych: no auditory or visual hallucinations     Objective:   Vital Signs:  Vitals:    03/07/25 1313   BP: 108/66   Pulse: (!) 56   Resp: 18     Physical Exam   General: alert and oriented, no acute distress  Head: normocephalic, atraumatic  Neck: supple, normal ROM  Respiratory: Symmetric expansion, non-labored  breathing  Cardiovascular: regular rate and rhythm  Abdomen: soft, non tender, non distended  Pelvic: deferred  Skin: normal coloration and turgor, no rashes, no suspicious skin lesions noted  Neuro: alert and oriented x3, no gross deficits  Psych: normal judgment and insight, normal mood/affect, and non-anxious    Lab Review   Urinalysis demonstrates negative for all components  PVR: 0 mL  Lab Results   Component Value Date    WBC 12.39 02/10/2025    HGB 11.6 (L) 02/10/2025    HCT 34.7 (L) 02/10/2025    MCV 95 02/10/2025     02/10/2025     Lab Results   Component Value Date    CREATININE 0.8 02/10/2025    BUN 14 02/10/2025       Imaging   None    Assessment:     1. Urethral irritation    2. Injury of bladder, subsequent encounter      Plan:   Diagnoses and all orders for this visit:    Urethral irritation  -     Ambulatory Referral/Consult to Physical/Occupational Therapy; Future  -     POCT URINE DIPSTICK WITHOUT MICROSCOPE    Injury of bladder, subsequent encounter  -     Urine Culture High Risk  -     jessica-m.blue-s.phos-phsal-hyo (URIBEL) 118-10-40.8-36 mg Cap; Take 1 capsule by mouth 3 (three) times daily as needed (bladder spasms).    Plan:  --UA negative and PVR 0 mL today!  --Urine culture today  --Discussed common bladder irritants such as caffeine, alcohol, citrus, and acidic and spicy foods. Encouraged to minimize in diet to reduce symptoms.  --Uribel PRN   --Reviewed SS of vesicovaginal fistula  --Referral to PFPT  --Follow up with GYN as scheduled

## 2025-03-08 LAB — BACTERIA UR CULT: ABNORMAL

## 2025-03-10 ENCOUNTER — RESULTS FOLLOW-UP (OUTPATIENT)
Dept: UROLOGY | Facility: CLINIC | Age: 45
End: 2025-03-10

## 2025-03-10 DIAGNOSIS — N30.00 ACUTE CYSTITIS WITHOUT HEMATURIA: Primary | ICD-10-CM

## 2025-03-10 DIAGNOSIS — R39.9 UTI SYMPTOMS: Primary | ICD-10-CM

## 2025-03-10 RX ORDER — NITROFURANTOIN 25; 75 MG/1; MG/1
100 CAPSULE ORAL 2 TIMES DAILY
Qty: 14 CAPSULE | Refills: 0 | Status: SHIPPED | OUTPATIENT
Start: 2025-03-10 | End: 2025-03-17

## 2025-03-11 DIAGNOSIS — B37.9 YEAST INFECTION: ICD-10-CM

## 2025-03-12 ENCOUNTER — PATIENT MESSAGE (OUTPATIENT)
Dept: OBSTETRICS AND GYNECOLOGY | Facility: CLINIC | Age: 45
End: 2025-03-12
Payer: COMMERCIAL

## 2025-03-12 RX ORDER — FLUCONAZOLE 150 MG/1
TABLET ORAL
Qty: 3 TABLET | Refills: 0 | Status: SHIPPED | OUTPATIENT
Start: 2025-03-12

## 2025-03-17 ENCOUNTER — RESULTS FOLLOW-UP (OUTPATIENT)
Dept: OBSTETRICS AND GYNECOLOGY | Facility: CLINIC | Age: 45
End: 2025-03-17

## 2025-03-17 ENCOUNTER — OFFICE VISIT (OUTPATIENT)
Dept: OBSTETRICS AND GYNECOLOGY | Facility: CLINIC | Age: 45
End: 2025-03-17
Payer: COMMERCIAL

## 2025-03-17 ENCOUNTER — LAB VISIT (OUTPATIENT)
Dept: LAB | Facility: OTHER | Age: 45
End: 2025-03-17
Attending: OBSTETRICS & GYNECOLOGY
Payer: COMMERCIAL

## 2025-03-17 VITALS
DIASTOLIC BLOOD PRESSURE: 82 MMHG | HEIGHT: 62 IN | WEIGHT: 164.25 LBS | BODY MASS INDEX: 30.22 KG/M2 | SYSTOLIC BLOOD PRESSURE: 130 MMHG

## 2025-03-17 DIAGNOSIS — N76.0 VAGINITIS AND VULVOVAGINITIS: ICD-10-CM

## 2025-03-17 DIAGNOSIS — N30.90 CYSTITIS: ICD-10-CM

## 2025-03-17 DIAGNOSIS — Z09 POSTOP CHECK: Primary | ICD-10-CM

## 2025-03-17 DIAGNOSIS — Z09 POSTOP CHECK: ICD-10-CM

## 2025-03-17 PROBLEM — Z90.710 S/P VH (VAGINAL HYSTERECTOMY): Status: RESOLVED | Noted: 2025-02-03 | Resolved: 2025-03-17

## 2025-03-17 PROBLEM — S37.20XA BLADDER INJURY: Status: RESOLVED | Noted: 2025-02-06 | Resolved: 2025-03-17

## 2025-03-17 LAB
BACTERIAL VAGINOSIS DNA: NOT DETECTED
BASOPHILS # BLD AUTO: 0.05 K/UL (ref 0–0.2)
BASOPHILS NFR BLD: 0.6 % (ref 0–1.9)
CANDIDA GLABRATA/KRUSEI: NOT DETECTED
CANDIDA RRNA VAG QL PROBE: NOT DETECTED
DIFFERENTIAL METHOD BLD: ABNORMAL
EOSINOPHIL # BLD AUTO: 0.2 K/UL (ref 0–0.5)
EOSINOPHIL NFR BLD: 2 % (ref 0–8)
ERYTHROCYTE [DISTWIDTH] IN BLOOD BY AUTOMATED COUNT: 14 % (ref 11.5–14.5)
HCT VFR BLD AUTO: 38.1 % (ref 37–48.5)
HGB BLD-MCNC: 12.2 G/DL (ref 12–16)
IMM GRANULOCYTES # BLD AUTO: 0.12 K/UL (ref 0–0.04)
IMM GRANULOCYTES NFR BLD AUTO: 1.4 % (ref 0–0.5)
LYMPHOCYTES # BLD AUTO: 2 K/UL (ref 1–4.8)
LYMPHOCYTES NFR BLD: 23.1 % (ref 18–48)
MCH RBC QN AUTO: 31.4 PG (ref 27–31)
MCHC RBC AUTO-ENTMCNC: 32 G/DL (ref 32–36)
MCV RBC AUTO: 98 FL (ref 82–98)
MONOCYTES # BLD AUTO: 0.7 K/UL (ref 0.3–1)
MONOCYTES NFR BLD: 7.7 % (ref 4–15)
NEUTROPHILS # BLD AUTO: 5.6 K/UL (ref 1.8–7.7)
NEUTROPHILS NFR BLD: 65.2 % (ref 38–73)
NRBC BLD-RTO: 0 /100 WBC
PLATELET # BLD AUTO: 313 K/UL (ref 150–450)
PMV BLD AUTO: 9.3 FL (ref 9.2–12.9)
RBC # BLD AUTO: 3.88 M/UL (ref 4–5.4)
TRICHOMONAS VAGINALIS: NOT DETECTED
WBC # BLD AUTO: 8.53 K/UL (ref 3.9–12.7)

## 2025-03-17 PROCEDURE — 99999 PR PBB SHADOW E&M-EST. PATIENT-LVL IV: CPT | Mod: PBBFAC,,, | Performed by: OBSTETRICS & GYNECOLOGY

## 2025-03-17 PROCEDURE — 87086 URINE CULTURE/COLONY COUNT: CPT | Performed by: OBSTETRICS & GYNECOLOGY

## 2025-03-17 PROCEDURE — 36415 COLL VENOUS BLD VENIPUNCTURE: CPT | Performed by: OBSTETRICS & GYNECOLOGY

## 2025-03-17 PROCEDURE — 85025 COMPLETE CBC W/AUTO DIFF WBC: CPT | Performed by: OBSTETRICS & GYNECOLOGY

## 2025-03-17 PROCEDURE — 99024 POSTOP FOLLOW-UP VISIT: CPT | Mod: S$GLB,,, | Performed by: OBSTETRICS & GYNECOLOGY

## 2025-03-17 PROCEDURE — 81515 NFCT DS BV&VAGINITIS DNA ALG: CPT | Performed by: OBSTETRICS & GYNECOLOGY

## 2025-03-17 NOTE — PROGRESS NOTES
CC: Postop visit    Shara Cordoba is a 44 y.o. female  post-op from [facility] on 2/3/25.  Patient is doing well postoperatively.  No pain.  No bowel or bladder complaints.  No fever.  Note complicated postop course.  Appreciate urology help with pt.    The pathology revealed:  benign    Past Medical History:   Diagnosis Date    GERD (gastroesophageal reflux disease)     Hypertension     Hypothyroidism     Peptic ulcer     Wears contact lenses      Past Surgical History:   Procedure Laterality Date    COLONOSCOPY      CYSTOSCOPY      CYSTOURETEROSCOPY WITH RETROGRADE PYELOGRAPHY AND INSERTION OF STENT INTO URETER Bilateral 2025    Procedure: CYSTOURETEROSCOPY, WITH RETROGRADE PYELOGRAM AND URETERAL STENT INSERTION;  Surgeon: Fransisco Adorno MD;  Location: [facility];  Service: Urology;  Laterality: Bilateral;    ESOPHAGOGASTRODUODENOSCOPY      HIP SURGERY Left     HYSTERECTOMY, VAGINAL, LAPAROSCOPY-ASSISTED, WITH SALPINGECTOMY Bilateral 2/3/2025    Procedure: HYSTERECTOMY,VAGINAL,LAPAROSCOPY-ASSISTED,WITH SALPINGECTOMY;  Surgeon: Klaus Diaz MD;  Location: [facility];  Service: OB/GYN;  Laterality: Bilateral;    HYSTEROSCOPIC POLYPECTOMY OF UTERUS N/A 2023    Procedure: POLYPECTOMY, UTERUS, HYSTEROSCOPIC;  Surgeon: Marita Dupont MD;  Location: [facility] OR;  Service: OB/GYN;  Laterality: N/A;    HYSTEROSCOPY WITH DILATION AND CURETTAGE OF UTERUS N/A 2023    Procedure: HYSTEROSCOPY, WITH DILATION AND CURETTAGE OF UTERUS;  Surgeon: Marita Dupont MD;  Location: [facility] OR;  Service: OB/GYN;  Laterality: N/A;  w/TrueClear  2nd case per Moraima    PARATHYROIDECTOMY      THERMAL ABLATION OF ENDOMETRIUM USING HYSTEROSCOPY N/A 2023    Procedure: ABLATION, ENDOMETRIUM, THERMAL, HYSTEROSCOPIC;  Surgeon: Marita Dupont MD;  Location: [facility] OR;  Service: OB/GYN;  Laterality: N/A;     Family History   Problem Relation Name Age of Onset    No Known Problems Mother      No Known Problems  "Father      Breast cancer Maternal Grandmother       Social History[1]  OB History    Para Term  AB Living   0 0 0 0 0 0   SAB IAB Ectopic Multiple Live Births   0 0 0 0 0       /82 (BP Location: Right arm, Patient Position: Sitting)   Ht 5' 2" (1.575 m)   Wt 74.5 kg (164 lb 3.9 oz)   LMP 2025   BMI 30.04 kg/m²     ROS:  GENERAL: No fever, chills, fatigability or weight loss.  VULVAR: No pain, no lesions and no itching.  VAGINAL: No relaxation, no itching, no discharge, no abnormal bleeding and no lesions.  ABDOMEN: No abdominal pain. Denies nausea. Denies vomiting. No diarrhea. No constipation  BREAST: Denies pain. No lumps. No discharge.  URINARY: No incontinence, no nocturia, no frequency and no dysuria.  CARDIOVASCULAR: No chest pain. No shortness of breath. No leg cramps.  NEUROLOGICAL: No headaches. No vision changes.    PE:   Abdominal:  soft, nt  External genitalia: normal general appearance  Urinary system: urethral meatus normal  Vaginal: normal without tenderness, induration or masses and cuff well healed.  Cervix: absent  Adnexa: normal bimanual exam  Uterus: absent  Female Clinical staff offered to be present for exam: yes       ICD-10-CM ICD-9-CM    1. Postop check  Z09 V67.00 CBC Auto Differential      2. Cystitis  N30.90 595.9 CBC Auto Differential      Urine Culture High Risk      3. 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  Discussed kegal exercises to help pelvic floor.  No future pap indicated.  Can return to work next week.                 [1]   Social History  Tobacco Use    Smoking status: Never    Smokeless tobacco: Never   Substance Use Topics    Alcohol use: Yes     Comment: occ.    Drug use: No     "

## 2025-03-17 NOTE — LETTER
March 17, 2025    Shara Cordoba  [address] [place] [address]         [address] [place] [address]  Phone: [phone]  Fax: [phone] March 17, 2025     Patient: Shara Cordoba   YOB: 1980   Date of Visit: 3/17/2025       To Whom It May Concern:    It is my medical opinion that Shara Cordoba may return to work on 3/24/2025 .    If you have any questions or concerns, please don't hesitate to call.    Sincerely,        Klaus Diaz MD

## 2025-03-20 LAB — BACTERIA UR CULT: NO GROWTH

## (undated) DEVICE — SUT VICRYL 3-0 27 SH

## (undated) DEVICE — SOL POVIDONE SCRUB IODINE 4 OZ

## (undated) DEVICE — SUT 0 8-27IN VICRYL PL CT-1

## (undated) DEVICE — DRAPE STERI LONG

## (undated) DEVICE — SYR IRRIGATION BULB STER 60ML

## (undated) DEVICE — SYR 10CC LUER LOCK

## (undated) DEVICE — PENCIL ELECTROSURG HOLST W/BLD

## (undated) DEVICE — IRRIGATOR ENDOSCOPY DISP.

## (undated) DEVICE — YANKAUER OPEN TIP W/O VENT

## (undated) DEVICE — TUBING SUC UNIV W/CONN 12FT

## (undated) DEVICE — JELLY SURGILUBE 5GR

## (undated) DEVICE — GRASPER EPIX 5X20MM 45CM

## (undated) DEVICE — SUT VICRYL PLUS 0 CT1 36IN

## (undated) DEVICE — GOWN NONREINF SET-IN SLV XL

## (undated) DEVICE — WAVEGUIDE Y TAPER TIP

## (undated) DEVICE — ELECTRODE REM PLYHSV RETURN 9

## (undated) DEVICE — SEALER LIGASURE OPEN 5MM 23CM

## (undated) DEVICE — GOWN ECLIPSE REINF LVL4 TWL LG

## (undated) DEVICE — TOWEL OR DISP STRL BLUE 4/PK

## (undated) DEVICE — KIT WING PAD POSITIONING

## (undated) DEVICE — PACK LAPAROSCOPY BAPTIST

## (undated) DEVICE — PAD ABDOMINAL STERILE 8X10IN

## (undated) DEVICE — SPONGE LAP 18X18 PREWASHED

## (undated) DEVICE — SOL IRR SOD CHL .9% POUR

## (undated) DEVICE — SOL POVIDONE PREP IODINE 4 OZ

## (undated) DEVICE — KIT GELPOINT TRNSVAG ACC 9.5CM

## (undated) DEVICE — SOL NORMAL USPCA 0.9%

## (undated) DEVICE — GLOVE SENSICARE PI SURG 6.5

## (undated) DEVICE — DRAPE THREE-QTR REINF 53X77IN